# Patient Record
Sex: FEMALE | Race: BLACK OR AFRICAN AMERICAN | NOT HISPANIC OR LATINO | Employment: FULL TIME | URBAN - METROPOLITAN AREA
[De-identification: names, ages, dates, MRNs, and addresses within clinical notes are randomized per-mention and may not be internally consistent; named-entity substitution may affect disease eponyms.]

---

## 2019-04-02 DIAGNOSIS — Z12.39 BREAST SCREENING: Primary | ICD-10-CM

## 2019-04-26 ENCOUNTER — HOSPITAL ENCOUNTER (OUTPATIENT)
Dept: RADIOLOGY | Facility: OTHER | Age: 64
Discharge: HOME | End: 2019-04-26
Attending: NURSE PRACTITIONER
Payer: MEDICARE

## 2019-04-26 VITALS — BODY MASS INDEX: 23.92 KG/M2 | HEIGHT: 63 IN | WEIGHT: 135 LBS

## 2019-04-26 DIAGNOSIS — Z12.39 BREAST SCREENING: ICD-10-CM

## 2019-04-26 PROCEDURE — 77063 MAMMO DIGITAL SCREENING BILAT WITH TOMOSYNTHESIS_CAD: ICD-10-PCS | Mod: 26,,, | Performed by: INTERNAL MEDICINE

## 2019-04-26 PROCEDURE — 77067 SCR MAMMO BI INCL CAD: CPT | Mod: 26,,, | Performed by: INTERNAL MEDICINE

## 2019-04-26 PROCEDURE — 77067 SCR MAMMO BI INCL CAD: CPT | Mod: TC

## 2019-04-26 PROCEDURE — 77063 BREAST TOMOSYNTHESIS BI: CPT | Mod: 26,,, | Performed by: INTERNAL MEDICINE

## 2019-04-26 PROCEDURE — 77067 MAMMO DIGITAL SCREENING BILAT WITH TOMOSYNTHESIS_CAD: ICD-10-PCS | Mod: 26,,, | Performed by: INTERNAL MEDICINE

## 2020-05-11 ENCOUNTER — LAB VISIT (OUTPATIENT)
Dept: PRIMARY CARE CLINIC | Facility: CLINIC | Age: 65
End: 2020-05-11
Payer: MEDICARE

## 2020-05-11 DIAGNOSIS — Z00.6 RESEARCH STUDY PATIENT: Primary | ICD-10-CM

## 2020-05-11 LAB — SARS-COV-2 IGG SERPLBLD QL IA.RAPID: NEGATIVE

## 2020-05-11 PROCEDURE — 86769 SARS-COV-2 COVID-19 ANTIBODY: CPT

## 2020-05-11 PROCEDURE — U0002 COVID-19 LAB TEST NON-CDC: HCPCS

## 2020-05-11 NOTE — RESEARCH
Date of Consent: 05/11/2020     Sponsor: [facility]    Study Title/IRB Number: Observational study of Sars-CoV2 Immunoglobulin G (IgG) seroprevalence among the [facility] population over time 2020.163  Principle Investigator: Melissa Farr, PhD    Did the patient need translation services? No   name: N/A    Prior to the Informed Consent (IC) being signed, or any study protocol required data collection, testing, procedure, or intervention being performed, the following was done and/or discussed:   Patient was given a paper copy of the IC for review    Patient was given study FAQ   Purpose of the study and qualifications to participate    Study design and tests or procedures done at this visit   Confidentiality and HIPAA Authorization for Release of Medical Records for the research trial/ subject's rights/research related injury   Risk, Benefits, Alternative Treatments, Compensation and Costs   Participation in the research trial is voluntary and patient may withdraw at anytime   Contact information for study related questions    Patient verbalizes understanding of the above: Yes  Contact information for PI and IRB given to patient: Yes  Patient able to adequately summarize: the purpose of the study, the risks associated with the study, and all procedures, testing, and follow-ups associated with the study: Yes    The consent was discussed verbally with the patient and all questions were answered satisfactorily. Patient gave verbal consent for the Seroprevalence research study with an IRB approval date of 05/08/2020.      The Consent, Consent Witness and name of Clinical Research Coordinator consenting was captured and documented in REDCap.    All Inclusion and Exclusion Criteria reviewed, subject meets all Inclusion criteria and does not meet any Exclusion Criteria at this time.     Patient Eligibility was confirmed.    Patient responded to survey questions.    The following biospecimen  collection procedures were collected:    -Nasopharyngeal Swab Collection  -Blood collection  Date of Consent: 05/11/2020     Sponsor: [facility]    Study Title/IRB Number: Observational study of Sars-CoV2 Immunoglobulin G (IgG) seroprevalence among the [facility] population over time 2020.163  Principle Investigator: Melissa Farr, PhD    Did the patient need translation services? No   name: N/A    Prior to the Informed Consent (IC) being signed, or any study protocol required data collection, testing, procedure, or intervention being performed, the following was done and/or discussed:   Patient was given a paper copy of the IC for review    Patient was given study FAQ   Purpose of the study and qualifications to participate    Study design and tests or procedures done at this visit   Confidentiality and HIPAA Authorization for Release of Medical Records for the research trial/ subject's rights/research related injury   Risk, Benefits, Alternative Treatments, Compensation and Costs   Participation in the research trial is voluntary and patient may withdraw at anytime   Contact information for study related questions    Patient verbalizes understanding of the above: Yes  Contact information for PI and IRB given to patient: Yes  Patient able to adequately summarize: the purpose of the study, the risks associated with the study, and all procedures, testing, and follow-ups associated with the study: Yes    The consent was discussed verbally with the patient and all questions were answered satisfactorily. Patient gave verbal consent for the Seroprevalence research study with an IRB approval date of 05/08/2020.      The Consent, Consent Witness and name of Clinical Research Coordinator consenting was captured and documented in REDCap.    All Inclusion and Exclusion Criteria reviewed, subject meets all Inclusion criteria and does not meet any Exclusion Criteria at this time.     Patient  Eligibility was confirmed.    Patient responded to survey questions.    The following biospecimen collection procedures were collected:    -Nasopharyngeal Swab Collection  -Blood collection

## 2020-05-12 LAB — SARS-COV-2 RNA RESP QL NAA+PROBE: NOT DETECTED

## 2020-12-15 DIAGNOSIS — Z12.31 ENCOUNTER FOR SCREENING MAMMOGRAM FOR MALIGNANT NEOPLASM OF BREAST: Primary | ICD-10-CM

## 2020-12-17 ENCOUNTER — HOSPITAL ENCOUNTER (OUTPATIENT)
Dept: RADIOLOGY | Facility: OTHER | Age: 65
Discharge: HOME | End: 2020-12-17
Attending: NURSE PRACTITIONER
Payer: MEDICARE

## 2020-12-17 DIAGNOSIS — Z12.31 ENCOUNTER FOR SCREENING MAMMOGRAM FOR MALIGNANT NEOPLASM OF BREAST: ICD-10-CM

## 2020-12-17 PROCEDURE — 77067 MAMMO DIGITAL SCREENING BILAT WITH TOMO: ICD-10-PCS | Mod: 26,,, | Performed by: RADIOLOGY

## 2020-12-17 PROCEDURE — 77063 BREAST TOMOSYNTHESIS BI: CPT | Mod: 26,,, | Performed by: RADIOLOGY

## 2020-12-17 PROCEDURE — 77063 MAMMO DIGITAL SCREENING BILAT WITH TOMO: ICD-10-PCS | Mod: 26,,, | Performed by: RADIOLOGY

## 2020-12-17 PROCEDURE — 77067 SCR MAMMO BI INCL CAD: CPT | Mod: 26,,, | Performed by: RADIOLOGY

## 2020-12-17 PROCEDURE — 77067 SCR MAMMO BI INCL CAD: CPT | Mod: TC

## 2021-10-26 DIAGNOSIS — Z12.31 ENCOUNTER FOR SCREENING MAMMOGRAM FOR MALIGNANT NEOPLASM OF BREAST: Primary | ICD-10-CM

## 2022-01-11 ENCOUNTER — HOSPITAL ENCOUNTER (OUTPATIENT)
Dept: RADIOLOGY | Facility: OTHER | Age: 67
Discharge: HOME | End: 2022-01-11
Attending: NURSE PRACTITIONER
Payer: MEDICARE

## 2022-01-11 DIAGNOSIS — Z12.31 ENCOUNTER FOR SCREENING MAMMOGRAM FOR MALIGNANT NEOPLASM OF BREAST: ICD-10-CM

## 2022-01-11 PROCEDURE — 77063 MAMMO DIGITAL SCREENING BILAT WITH TOMO: ICD-10-PCS | Mod: 26,,, | Performed by: RADIOLOGY

## 2022-01-11 PROCEDURE — 77067 MAMMO DIGITAL SCREENING BILAT WITH TOMO: ICD-10-PCS | Mod: 26,,, | Performed by: RADIOLOGY

## 2022-01-11 PROCEDURE — 77067 SCR MAMMO BI INCL CAD: CPT | Mod: TC

## 2022-01-11 PROCEDURE — 77067 SCR MAMMO BI INCL CAD: CPT | Mod: 26,,, | Performed by: RADIOLOGY

## 2022-01-11 PROCEDURE — 77063 BREAST TOMOSYNTHESIS BI: CPT | Mod: 26,,, | Performed by: RADIOLOGY

## 2022-01-11 PROCEDURE — 77063 BREAST TOMOSYNTHESIS BI: CPT | Mod: TC

## 2022-02-08 ENCOUNTER — CLINICAL SUPPORT (OUTPATIENT)
Dept: SMOKING CESSATION | Facility: CLINIC | Age: 67
End: 2022-02-08
Payer: COMMERCIAL

## 2022-02-08 DIAGNOSIS — F17.200 NICOTINE DEPENDENCE: Primary | ICD-10-CM

## 2022-02-08 PROCEDURE — 99404 PR PREVENT COUNSEL,INDIV,60 MIN: ICD-10-PCS | Mod: S$GLB,,,

## 2022-02-08 PROCEDURE — 99404 PREV MED CNSL INDIV APPRX 60: CPT | Mod: S$GLB,,,

## 2022-02-08 RX ORDER — MICONAZOLE NITRATE 2 %
2 CREAM (GRAM) TOPICAL
Qty: 50 EACH | Refills: 0 | Status: SHIPPED | OUTPATIENT
Start: 2022-02-08

## 2022-02-08 RX ORDER — IBUPROFEN 200 MG
1 TABLET ORAL DAILY
Qty: 14 PATCH | Refills: 0 | Status: SHIPPED | OUTPATIENT
Start: 2022-02-08

## 2022-02-08 RX ORDER — FLUTICASONE PROPIONATE 50 MCG
1 SPRAY, SUSPENSION (ML) NASAL DAILY
COMMUNITY

## 2022-02-08 NOTE — Clinical Note
Patient will be participating in biweekly tobacco cessation sessions and will begin the prescribed tobacco cessation medication regimen of 21 mg nicotine patch daily and 2 mg nicotine gum PRN(in place of a cigarette).

## 2022-02-22 ENCOUNTER — CLINICAL SUPPORT (OUTPATIENT)
Dept: SMOKING CESSATION | Facility: CLINIC | Age: 67
End: 2022-02-22
Payer: COMMERCIAL

## 2022-02-22 DIAGNOSIS — F17.200 NICOTINE DEPENDENCE: Primary | ICD-10-CM

## 2022-02-22 PROCEDURE — 99403 PREV MED CNSL INDIV APPRX 45: CPT | Mod: S$GLB,,,

## 2022-02-22 PROCEDURE — 99403 PR PREVENT COUNSEL,INDIV,45 MIN: ICD-10-PCS | Mod: S$GLB,,,

## 2022-02-22 NOTE — Clinical Note
Patient seen in clinic today, she states tobacco free since 2/8/2022. Commended patient on the accomplishment thus far. Patient remains on prescribed tobacco cessation medication regimen of 21 mg patch without any negative side effects at this time. The patient denies any abnormal behavioral or mental changes at this time. She states no refill needed.  Discussed and reviewed strategies, controlling environment, cues, triggers, new goals set. Introduced high risk situations with preparation interventions, caffeine similarities with withdrawal issues of habit and nicotine, alcohol, understanding urges, cravings, stress and relaxation.

## 2022-02-22 NOTE — PROGRESS NOTES
Individual Follow-Up Form    2/22/2022    Quit Date: 2/8/2022    Clinical Status of Patient: Outpatient    Length of Service: 45 minutes    Continuing Medication: yes  Patches    Other Medications:      Target Symptoms: Withdrawal and medication side effects. The following were  rated moderate (3) to severe (4) on TCRS:  · Moderate (3): none  · Severe (4): none    Comments: Patient seen in clinic today, she states tobacco free since 2/8/2022. Commended patient on the accomplishment thus far. Patient remains on prescribed tobacco cessation medication regimen of 21 mg patch without any negative side effects at this time. The patient denies any abnormal behavioral or mental changes at this time. She states no refill needed.   Discussed and reviewed strategies, controlling environment, cues, triggers, new goals set. Introduced high risk situations with preparation interventions, caffeine similarities with withdrawal issues of habit and nicotine, alcohol, understanding urges, cravings, stress and relaxation.         Diagnosis: F17.200    Next Visit: 2 weeks

## 2022-03-14 ENCOUNTER — TELEPHONE (OUTPATIENT)
Dept: SMOKING CESSATION | Facility: CLINIC | Age: 67
End: 2022-03-14
Payer: MEDICARE

## 2022-03-14 NOTE — TELEPHONE ENCOUNTER
Called patient in regard to missed tobacco cessation follow up appointment, had to leave a detailed message with name and telephone number for a call back.

## 2022-05-29 ENCOUNTER — HOSPITAL ENCOUNTER (EMERGENCY)
Facility: OTHER | Age: 67
Discharge: HOME | End: 2022-05-29
Attending: EMERGENCY MEDICINE
Payer: MEDICARE

## 2022-05-29 VITALS
DIASTOLIC BLOOD PRESSURE: 54 MMHG | SYSTOLIC BLOOD PRESSURE: 104 MMHG | BODY MASS INDEX: 23.92 KG/M2 | RESPIRATION RATE: 18 BRPM | TEMPERATURE: 99 F | WEIGHT: 135 LBS | HEIGHT: 63 IN | HEART RATE: 101 BPM | OXYGEN SATURATION: 95 %

## 2022-05-29 DIAGNOSIS — J18.9 COMMUNITY ACQUIRED PNEUMONIA OF LEFT LOWER LOBE OF LUNG: Primary | ICD-10-CM

## 2022-05-29 DIAGNOSIS — R50.9 FEVER: ICD-10-CM

## 2022-05-29 LAB
CTP QC/QA: YES
CTP QC/QA: YES
POC MOLECULAR INFLUENZA A AGN: NEGATIVE
POC MOLECULAR INFLUENZA B AGN: NEGATIVE
SARS-COV-2 RDRP RESP QL NAA+PROBE: NEGATIVE

## 2022-05-29 PROCEDURE — 63700000 PHARM REV CODE 250 ALT 637 W/O HCPCS: Performed by: EMERGENCY MEDICINE

## 2022-05-29 PROCEDURE — 99284 EMERGENCY DEPT VISIT MOD MDM: CPT | Mod: 25

## 2022-05-29 PROCEDURE — U0002 COVID-19 LAB TEST NON-CDC: HCPCS | Performed by: EMERGENCY MEDICINE

## 2022-05-29 PROCEDURE — 25000003 PHARM REV CODE 250: Performed by: EMERGENCY MEDICINE

## 2022-05-29 RX ORDER — ACETAMINOPHEN 325 MG/1
650 TABLET ORAL
Status: COMPLETED | OUTPATIENT
Start: 2022-05-29 | End: 2022-05-29

## 2022-05-29 RX ORDER — PROMETHAZINE HYDROCHLORIDE AND CODEINE PHOSPHATE 6.25; 1 MG/5ML; MG/5ML
5 SOLUTION ORAL EVERY 4 HOURS PRN
Qty: 118 ML | Refills: 0 | Status: SHIPPED | OUTPATIENT
Start: 2022-05-29 | End: 2022-06-08

## 2022-05-29 RX ORDER — BENZONATATE 100 MG/1
100 CAPSULE ORAL 3 TIMES DAILY PRN
Qty: 20 CAPSULE | Refills: 0 | Status: SHIPPED | OUTPATIENT
Start: 2022-05-29 | End: 2022-06-08

## 2022-05-29 RX ORDER — AZITHROMYCIN 250 MG/1
250 TABLET, FILM COATED ORAL DAILY
Qty: 6 TABLET | Refills: 0 | Status: SHIPPED | OUTPATIENT
Start: 2022-05-29 | End: 2023-09-19

## 2022-05-29 RX ORDER — AZITHROMYCIN 250 MG/1
500 TABLET, FILM COATED ORAL
Status: COMPLETED | OUTPATIENT
Start: 2022-05-29 | End: 2022-05-29

## 2022-05-29 RX ADMIN — Medication 650 MG: at 08:05

## 2022-05-29 RX ADMIN — AZITHROMYCIN MONOHYDRATE 500 MG: 250 TABLET ORAL at 09:05

## 2022-05-30 NOTE — ED TRIAGE NOTES
Pt arrived with c/o malaise x 3 days.  Pt had a negative home COVID test today.  Pt reports productive cough, endorses HA and dizziness.  Ambulatory with steady gait, no assistance needed.  Denies any n/v/d.  Febrile in triage, has not taken any anti-pyretics today.  aao x 4.  Answering questions appropriately.

## 2022-05-30 NOTE — ED PROVIDER NOTES
"     Source of History:  Patient    Chief complaint:  COVID-19 Concerns (Body aches with a headache for the past three days.) and Fever (Pt is also running at fever at this time 102.3)      HPI:  Catalina Mcgarry is a 67 y.o. female presenting with gradual onset of body aches, fevers, chills and headache this been going on for 3 days.  Also had temperature up to 102.3.  Intermittent shortness of breath.  Also has associated nausea vomiting.  No abdominal pain or diarrhea.  She has been vaccinated for COVID.  No known sick contacts.    This is the extent to the patients complaints today here in the emergency department.    ROS: As per HPI and below:  Constitutional:  Fevers    Eyes: No visual changes.  ENT:  Congestion   Cardiovascular: No chest pain.  Respiratory: No shortness of breath cough  GI: No abdominal pain.  No nausea or vomiting.  Genito-Urinary: No abnormal urination.  Neurologic: No focal weakness.  No numbness.   MSK: no back pain.  Integument: No rashes or lesions.  Hematologic: No easy bruising.  Endocrine: No excessive thirst or urination.    Review of patient's allergies indicates:  No Known Allergies    PMH:  As per HPI and below:  Past Medical History:   Diagnosis Date    Diabetes mellitus     Diabetes mellitus, type 2     Headache(784.0)      Past Surgical History:   Procedure Laterality Date    BREAST BIOPSY      GUM SURGERY         Social History     Tobacco Use    Smoking status: Former Smoker     Packs/day: 0.50     Types: Cigarettes     Quit date: 2014     Years since quittin.8   Substance Use Topics    Alcohol use: No    Drug use: No       Physical Exam:    /65   Pulse 110   Temp (!) 102.8 °F (39.3 °C) (Oral)   Resp 16   Ht 5' 3" (1.6 m)   Wt 61.2 kg (135 lb)   SpO2 95%   BMI 23.91 kg/m²   Nursing note and vital signs reviewed.  Constitutional: No acute distress.  Nontoxic  Eyes: No conjunctival injection.  Extraocular muscles are intact.  ENT: Oropharynx clear. " No tonisillar exudate or swelling. Uvula midline and normal. No elevation of posterior oropharynx.  Nasal congestion with mucosal edema  Cardiovascular:  Tachycardia regular No murmurs. No gallops. No rubs.  Respiratory: Clear to auscultation bilaterally.  Good air movement.  No wheezes.  No rhonchi. No rales. No accessory muscle use.  Abdomen: Soft.  Not distended.  Nontender.  No guarding.  No rebound. Non-peritoneal.  Musculoskeletal: Good range of motion all joints.  No deformities.  Neck supple.  No meningismus.  Skin: No rashes seen.  Good turgor.  No abrasions.  No ecchymoses.  Neuro: alert and oriented x3,  no focal neurological deficits.  Psych: Appropriate, conversant    Labs that have been ordered have been independently reviewed and interpreted by myself.    I decided to obtain the patient's medical records.  Summary of Medical Records:      MDM/ Differential Dx:  67 y.o. female with symptoms suggestive of viral etiology.  Possible influenza or COVID which will test for.  Vital signs otherwise showed no significant abnormality and the patient appears well.  I do not suspect sepsis do not feel any blood work is indicated.  Due to the patient the fact that she is diabetic will get an x-ray to rule out pneumonia and plan for discharge.    ED Course as of 05/29/22 2040   Sun May 29, 2022   2012 POC Molecular Influenza A Ag: Negative [SM]   2012 POC Molecular Influenza B Ag: Negative [SM]   2012 SARS-CoV-2 RNA, Amplification, Qual: Negative [SM]   2031 X-Ray Chest AP Portable  Chest x-ray independently interpreted by myself shows normal cardiac silhouette, no large focal consolidation, some increased interstitial markings in the left base.  No bony abnormalities or pneumothorax.  Overall impression atelectasis verses early pneumonia left lower base. [SM]   2039 No further workup indicated here in the emergency department.    I updated pt regarding results and I counseled pt regarding supportive care measures.   Diagnosis and treatment plan explained to patient. I have answered all questions and the patient is satisfied with the plan of care. Patient discharged home in stable condition.  [SM]      ED Course User Index  [SM] Robert Devlin DO                 Diagnostic Impression:    1. Community acquired pneumonia of left lower lobe of lung    2. Fever         ED Disposition Condition    Discharge Stable          ED Prescriptions     Medication Sig Dispense Start Date End Date Auth. Provider    azithromycin (Z-RAFAEL) 250 MG tablet Take 1 tablet (250 mg total) by mouth once daily. Take first 2 tablets together, then 1 every day until finished. 6 tablet 5/29/2022  Robert Devlin DO    benzonatate (TESSALON) 100 MG capsule Take 1 capsule (100 mg total) by mouth 3 (three) times daily as needed for Cough. 20 capsule 5/29/2022 6/8/2022 Robert Devlin DO        Follow-up Information     Follow up With Specialties Details Why Contact Info    Pamela Turner NP Urgent Care Schedule an appointment as soon as possible for a visit in 1 week  [address] [address]  [phone]      [facility] Emergency Dept Emergency Medicine  If symptoms worsen [address] [id number] [phone]           Robert Devlin DO  05/29/22 2040

## 2022-09-07 ENCOUNTER — CLINICAL SUPPORT (OUTPATIENT)
Dept: SMOKING CESSATION | Facility: CLINIC | Age: 67
End: 2022-09-07
Payer: COMMERCIAL

## 2022-09-07 DIAGNOSIS — F17.200 NICOTINE DEPENDENCE: Primary | ICD-10-CM

## 2022-09-07 PROCEDURE — 99407 BEHAV CHNG SMOKING > 10 MIN: CPT | Mod: S$GLB,,,

## 2022-09-07 PROCEDURE — 99407 PR TOBACCO USE CESSATION INTENSIVE >10 MINUTES: ICD-10-PCS | Mod: S$GLB,,,

## 2022-09-07 NOTE — PROGRESS NOTES
Called pt to f/u on her 3/6 month smoking cessation quit status. Pt stated she remains tobacco free since 2/8/22 after smoking 2 ppd. Congratulated her on her hard work and success. Informed her of benefit period, phone follow ups, and contact information. Will complete smart form for 3/6 months and will continue to follow up on quit #1 episode.

## 2023-02-02 DIAGNOSIS — Z12.31 SCREENING MAMMOGRAM, ENCOUNTER FOR: Primary | ICD-10-CM

## 2023-02-08 ENCOUNTER — CLINICAL SUPPORT (OUTPATIENT)
Dept: SMOKING CESSATION | Facility: CLINIC | Age: 68
End: 2023-02-08
Payer: COMMERCIAL

## 2023-02-08 DIAGNOSIS — F17.200 NICOTINE DEPENDENCE: Primary | ICD-10-CM

## 2023-02-08 PROCEDURE — 99407 PR TOBACCO USE CESSATION INTENSIVE >10 MINUTES: ICD-10-PCS | Mod: S$GLB,,, | Performed by: FAMILY MEDICINE

## 2023-02-08 PROCEDURE — 99407 BEHAV CHNG SMOKING > 10 MIN: CPT | Mod: S$GLB,,, | Performed by: FAMILY MEDICINE

## 2023-02-08 NOTE — PROGRESS NOTES
Spoke with patient today in regard to smoking cessation progress, he states tobacco free. Commended patient on the accomplishment. Informed patient of benefit period and contact information if any further help or support is needed. Will resolve episode and complete smart form for Quit attempt #1.

## 2023-08-29 ENCOUNTER — TELEPHONE (OUTPATIENT)
Dept: INTERNAL MEDICINE | Facility: CLINIC | Age: 68
End: 2023-08-29
Payer: MEDICARE

## 2023-08-29 NOTE — TELEPHONE ENCOUNTER
Patient has diabetes and just started on insulin pump, requesting to switch diabetes providers...   Can you call her and see if she can schedule in next 3 - 4 weeks with me?   This is a new patient, on pump     ThanksAbby

## 2023-09-19 ENCOUNTER — OFFICE VISIT (OUTPATIENT)
Dept: INTERNAL MEDICINE | Facility: CLINIC | Age: 68
End: 2023-09-19
Payer: MEDICARE

## 2023-09-19 DIAGNOSIS — E10.3593 PROLIFERATIVE DIABETIC RETINOPATHY OF BOTH EYES ASSOCIATED WITH TYPE 1 DIABETES MELLITUS, UNSPECIFIED PROLIFERATIVE RETINOPATHY TYPE: ICD-10-CM

## 2023-09-19 DIAGNOSIS — E10.65 TYPE 1 DIABETES MELLITUS WITH HYPERGLYCEMIA: Primary | ICD-10-CM

## 2023-09-19 DIAGNOSIS — E10.8 DIABETES MELLITUS TYPE 1, WITH COMPLICATION, ON LONG TERM INSULIN PUMP: ICD-10-CM

## 2023-09-19 DIAGNOSIS — Z96.41 DIABETES MELLITUS TYPE 1, WITH COMPLICATION, ON LONG TERM INSULIN PUMP: ICD-10-CM

## 2023-09-19 PROCEDURE — 95251 CONT GLUC MNTR ANALYSIS I&R: CPT | Mod: S$GLB,,, | Performed by: NURSE PRACTITIONER

## 2023-09-19 PROCEDURE — 99215 PR OFFICE/OUTPT VISIT, EST, LEVL V, 40-54 MIN: ICD-10-PCS | Mod: S$GLB,,, | Performed by: NURSE PRACTITIONER

## 2023-09-19 PROCEDURE — 99215 OFFICE O/P EST HI 40 MIN: CPT | Mod: S$GLB,,, | Performed by: NURSE PRACTITIONER

## 2023-09-19 PROCEDURE — 95251 PR GLUCOSE MONITOR, 72 HOUR, PHYS INTERP: ICD-10-PCS | Mod: S$GLB,,, | Performed by: NURSE PRACTITIONER

## 2023-09-19 PROCEDURE — 99999 PR PBB SHADOW E&M-EST. PATIENT-LVL I: CPT | Mod: PBBFAC,,, | Performed by: NURSE PRACTITIONER

## 2023-09-19 PROCEDURE — 95249 PR GLUCOSE MONITORING, 72 HRS, SUB-Q SENSOR, PATIENT PROVIDED: ICD-10-PCS | Mod: S$GLB,,, | Performed by: NURSE PRACTITIONER

## 2023-09-19 PROCEDURE — 95249 CONT GLUC MNTR PT PROV EQP: CPT | Mod: S$GLB,,, | Performed by: NURSE PRACTITIONER

## 2023-09-19 PROCEDURE — 99999 PR PBB SHADOW E&M-EST. PATIENT-LVL I: ICD-10-PCS | Mod: PBBFAC,,, | Performed by: NURSE PRACTITIONER

## 2023-09-19 RX ORDER — BLOOD-GLUCOSE SENSOR
1 EACH MISCELLANEOUS
Qty: 10 EACH | Refills: 3 | Status: SHIPPED | OUTPATIENT
Start: 2023-09-19 | End: 2024-09-18

## 2023-09-19 RX ORDER — INSULIN ASPART 100 [IU]/ML
100 INJECTION, SOLUTION INTRAVENOUS; SUBCUTANEOUS CONTINUOUS
Qty: 40 ML | Refills: 11 | Status: SHIPPED | OUTPATIENT
Start: 2023-09-19 | End: 2023-09-21 | Stop reason: SDUPTHER

## 2023-09-19 RX ORDER — INSULIN PMP CART,AUT,G6/7,CNTR
1 EACH SUBCUTANEOUS
Qty: 30 EACH | Refills: 3 | Status: SHIPPED | OUTPATIENT
Start: 2023-09-19 | End: 2023-11-28 | Stop reason: SDUPTHER

## 2023-09-19 RX ORDER — BLOOD-GLUCOSE TRANSMITTER
1 EACH MISCELLANEOUS DAILY
Qty: 1 EACH | Refills: 3 | Status: SHIPPED | OUTPATIENT
Start: 2023-09-19

## 2023-09-19 RX ORDER — GLUCAGON 3 MG/1
1 POWDER NASAL DAILY PRN
Qty: 2 EACH | Refills: 1 | Status: SHIPPED | OUTPATIENT
Start: 2023-09-19 | End: 2024-02-07 | Stop reason: SDUPTHER

## 2023-09-19 NOTE — PROGRESS NOTES
HPI: Catalina Mcgarry is a 68 y.o.  female c/I for visit to address Diabetes Type 1  This is the first time I am seeing her for care, follows with Pamela King, NP for primary care needs.   Has seen endocrinologist in the past - Dr. Echeverria in the past but had trouble with follow up. Only seen 1 time.   Was trained with diabetes educator on new insulin pump.      was diagnosed with T1DM during pregnancy in 1978 -   Began on insulin injections at that time, her mother had T2dm -   Her son has type 1 diabetes and is on injections- now pump therapy.   Has never been hospitalized r/t DM. Has never had DKA in the past.   Denies missing doses of DM medication.      On omnipod 5 - began/trained with Omnipod educator - about 1 month ago now.   Omnipod 5 - pods come from [email or web address].   Using novolog U100   Is in automated mode -   Bassal rate is at 0. 65 units/hour -   Target glucose is 120 -   Correct above 120 -   Insulin to carb ratio 1: 15 -   ICF - 1: 55   Duration of action - 3 hours.      Glooko report - we could not program her pdm    Has to re-register it - so was unable to download a Glooko report.      Dexcom G6 - gets supplies from [email or web address].   Shows gmi is at 7.1% -   Average bg of 157   73% time in range.   1% lows.   0% very low  25% highs.   1% very highs.        Labs reviewed in past - cpeptide >0.01 in 2011 -   Haven't repeated since 2011.      Complications from diabetes and pertinent co-morbidities include:   Negative for DKA.   Has BEEN ON INSULIN FOR 45 + YEARS SINCE DIAGNOSIS.   -negative for diabetic neuropathy.   -negative for nephropathy.   No microalbuminuria.   Eyes:  + Diabetic retinopathy   CV: Denies history of MI nor stroke.   CAD: Denies.  Takes aspirin 81mg tablet daily  BP: has history of HTN  Statin: Not taking  ACE/ARB: Not taking     Social History           Tobacco Use   Smoking Status Former    Current packs/day: 0.00    Types: Cigarettes    Quit date: 7/14/2014     Years since quittin.1   Smokeless Tobacco Not on file      Past medical History:        Past Medical History:   Diagnosis Date    Diabetes mellitus      Diabetes mellitus, type 2      Headache(784.0)        Family hx:         Family History   Problem Relation Age of Onset    Diabetes Maternal Aunt      Diabetes Son      Breast cancer Maternal Grandmother      Migraines Neg Hx        Current meds:   Current Outpatient Medications:     acetaminophen (TYLENOL) 500 MG tablet, Take 2 tablets (1,000 mg total) by mouth every 8 (eight) hours as needed for Pain., Disp: 30 tablet, Rfl: 0    ascorbic acid (VITAMIN C) 500 MG tablet, Take 500 mg by mouth once daily., Disp: , Rfl:     blood-glucose sensor (DEXCOM G6 SENSOR) Eileen, 1 each by Misc.(Non-Drug; Combo Route) route every 10 days. Apply/change sensor to skin every 10 days., Disp: 10 each, Rfl: 3    blood-glucose transmitter (DEXCOM G6 TRANSMITTER) Eileen, 1 each by Misc.(Non-Drug; Combo Route) route Daily. Use transmitter in sensor with G6 system. Change new transmitter every 3 months., Disp: 1 each, Rfl: 3    fluticasone propionate (FLONASE) 50 mcg/actuation nasal spray, 1 spray by Each Nostril route once daily., Disp: , Rfl:     glucagon (BAQSIMI) 3 mg/actuation Spry, 1 each by Nasal route daily as needed (IF GLUCOSE IS LESS THAN 70)., Disp: 2 each, Rfl: 1    ibuprofen (ADVIL,MOTRIN) 600 MG tablet, Take 1 tablet (600 mg total) by mouth every 8 (eight) hours as needed for Pain., Disp: 20 tablet, Rfl: 0    insulin aspart U-100 (NOVOLOG) 100 unit/mL injection, Inject 100 Units into the skin continuous. Gives up to 100 units daily via Omnipod insulin pump - do not directly inject. Only use with insulin pump, Disp: 40 mL, Rfl: 11    insulin pump cart,automated,BT (OMNIPOD 5 G6 PODS, GEN 5,) Crtg, Inject 1 each into the skin every 72 hours. Use with omnipod 5 pdm as directed., Disp: 30 each, Rfl: 3    nicotine (NICODERM CQ) 21 mg/24 hr, Place 1 patch onto the skin once  "daily., Disp: 14 patch, Rfl: 0    nicotine, polacrilex, (NICORETTE) 2 mg Gum, Take 1 each (2 mg total) by mouth as needed (in place of a cigarette). Max 10-20 pieces per day, Disp: 50 each, Rfl: 0      Current Diabetes medications:   Novolog U100 via omnipod 5 -      Medications Tried and Failed:   Lantus 20 units daily.   Novolog 3 units tid ac meals.   Metformin - Gi issues.      Social:   No Life changes/stressors currently:   Diet: following ADA diet for most part  Meals: 3 per day and snacks.     Exercise: yes - regularly 3 times per week.   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       Standards of care:   Eyes: .Most Recent Eye Exam Date: Not Found  Foot exam: Most Recent Foot Exam Date: Not Found   Diabetes education: None.     Vital Signs  There were no vitals taken for this visit.     Pertinent Labs:   Hgba1c         Lab Results   Component Value Date     HGBA1C 10.7 (H) 03/17/2011      Lipid panel No results found for: "CHOL"  No results found for: "HDL"  No results found for: "LDLCALC"  No results found for: "TRIG"  No results found for: "CHOLHDL"   CMP        Glucose   Date Value Ref Range Status   03/31/2013 117 (H) 70 - 110 mg/dl Final            BUN   Date Value Ref Range Status   03/31/2013 11 6 - 20 mg/dl Final            Creatinine   Date Value Ref Range Status   03/31/2013 0.7 0.5 - 1.4 mg/dl Final              eGFR if    Date Value Ref Range Status   03/31/2013 >60 >60 mL/min Final       Comment:       Estimated glomerular filtration rate (eGFR) is normalized to an  average body surface area of 1.73 square meters.  The calculation  used to obtain the eGFR is the adjusted MDRD equation, which factors  patient sex, age, race, and creatinine result.  Since race is unknown  in our information system, the eGFR values for -American  and Non--American patients are given for each " creatinine  result.            eGFR if non    Date Value Ref Range Status   03/31/2013 >60 >60 mL/min Final            AST   Date Value Ref Range Status   03/31/2013 11 10 - 40 U/L Final            ALT   Date Value Ref Range Status   03/31/2013 9 (L) 10 - 44 U/L Final      Microalbumin creatinine ratio:         Lab Results   Component Value Date     KATELYN 6.4 02/24/2011         Review Of Systems:   Gen: Appetite good, no weight gain or loss, denies fatigue and weakness. Denies polydipsia.  Skin: Skin is intact and heals well, denies any rashes or hair changes.   EENT: Denies any acute visual disturbances, nor blurred vision.    Resp: Denies SOB or Dyspnea on exertion, denies cough.   Cardiac: Denies chest pain, palpitations, or swelling.   GI: Denies abdominal pain, nausea or vomiting, diarrhea, or constipation.   /GYN: Denies nocturia, nor burning, frequency or pain on urination.  MS/Neuro: Denies numbness/ tingling in BLE; Gait steady, speech clear  Psych: Denies drug/ETOH abuse, no hx of depression.  Other systems: negative.     Physical Exam:   GENERAL: Well developed, well nourished in appearance.   PSYCH: AAOx3, appropriate mood and affect, pleasant expression, conversant, appears relaxed, well groomed.   EYES: PERRL, Conjunctiva and corneas clear  NECK: Soft and Supple, trachea midline,   CHEST: Even, regular, and unlabored respirations  ABDOMEN: Soft, non-tender, non-distended.   VASCULAR: pedal pulses palpable bilaterally, no edema.  NEURO:  cranial nerves II - XII intact   MUSCULOSKELETAL: Good ROM, equal strength, equal hand grasp, steady gait.   SKIN: Skin warm, dry, and intact     Assessment and Plan of Care:      Diagnoses and all orders for this visit:     Type 1 diabetes mellitus with hyperglycemia  -     Anti-islet cell antibody; Future  -     Comprehensive Metabolic Panel; Future  -     C-Peptide; Future  -     Glutamic Acid Decarboxylase; Future  -     Hemoglobin A1C;  Future  -     Lipid Panel; Future  -     Microalbumin/Creatinine Ratio, Urine; Future  -     insulin aspart U-100 (NOVOLOG) 100 unit/mL injection; Inject 100 Units into the skin continuous. Gives up to 100 units daily via Omnipod insulin pump - do not directly inject. Only use with insulin pump  -     glucagon (BAQSIMI) 3 mg/actuation Spry; 1 each by Nasal route daily as needed (IF GLUCOSE IS LESS THAN 70).  -     insulin pump cart,automated,BT (OMNIPOD 5 G6 PODS, GEN 5,) Crtg; Inject 1 each into the skin every 72 hours. Use with omnipod 5 pdm as directed.  -     blood-glucose sensor (DEXCOM G6 SENSOR) Eileen; 1 each by Misc.(Non-Drug; Combo Route) route every 10 days. Apply/change sensor to skin every 10 days.  -     blood-glucose transmitter (DEXCOM G6 TRANSMITTER) Eileen; 1 each by Misc.(Non-Drug; Combo Route) route Daily. Use transmitter in sensor with G6 system. Change new transmitter every 3 months.     Proliferative diabetic retinopathy of both eyes associated with type 1 diabetes mellitus, unspecified proliferative retinopathy type     Diabetes mellitus type 1, with complication, on long term insulin pump           1. T1DM with hyperglycemia- Hgba1c goal is 7.5% or less without hypoglycemia - 10.7% - no current a1c - dexcom estimates her at 7.1% -   Repeat labs in am - type 1 labs, cmp, new a1c, and lipid panel.   No changes on pdm currently - need to log in and print out glooko report.   Keep target bg at 120 -   Basal rate at 0.65 units/hour.   Carb ratio 1: 15   ISF is at 1: 55   Basal rate no changes.   Continue to bolus before meals.   Sent vials and pods for refill to local [facility] pharmacy.   Dexcom g6 - supplies to [facility].   discussed DM, progression of disease, long term complications, CV risk factors and tx options.   Advise compliance with ADA diet and encourage exercise  Reviewed  hypoglycemia, s/s and appropriate tx. Have/get quick acting glucose tablets at hand.   Instructed to monitor Blood  glucose 2 - 4x/day and bring meter/ log to every clinic visit.   Gets injections in her eyes - Dr. Herrera.      2. HTN- controlled, continue meds as previously prescribed and monitor.   Needs urine mac lab done.      3. Lipids- LDL goal < 100. Need lipid panel - obtain.      4. Weight - BMI There is no height or weight on file to calculate BMI.   Encourage Ada diet and exercise.      5. Renal Function - get current cmp.            Follow up in 6 weeks time.                Deleted by: Abby Conklin NP at 9/19/2023  4:15 PM

## 2023-09-19 NOTE — PATIENT INSTRUCTIONS
For low blood sugar - remember to keep glucose tablets on hand. You can purchase these at the pharmacy check out desk/over the counter.   If blood sugar is less than 70, take/eat 2 - 3 tablets quickly to bring your sugar up.   Always keep 15 grams of Quick acting carbohydrates on hand to eat/drink if your sugar is low - examples are 1/2 cup of juice, coke, or crackers, granola bars.   Remember to eat meals frequently to prevent low blood blood sugars.

## 2023-09-19 NOTE — PROGRESS NOTES
HPI: Catalina Mcgarry is a 68 y.o.  female c/I for visit to address Diabetes Type 1  This is the first time I am seeing her for care, follows with Pamela King, NP for primary care needs.   Has seen endocrinologist in the past - Dr. Echeverria in the past but had trouble with follow up. Only seen 1 time.   Was trained with diabetes educator on new insulin pump.     was diagnosed with T1DM during pregnancy in  -   Began on insulin injections at that time, her mother had T2dm -   Her son has type 1 diabetes and is on injections- now pump therapy.   Has never been hospitalized r/t DM. Has never had DKA in the past.   Denies missing doses of DM medication.     On omnipod 5 - began/trained with Omnipod educator - about 1 month ago now.   Omnipod 5 - pods come from [employer].   Using novolog U100   Is in automated mode -   Bassal rate is at 0. 65 units/hour -   Target glucose is 120 -   Correct above 120 -   Insulin to carb ratio 1: 15 -   ICF - 1: 55   Duration of action - 3 hours.     Glooko report - we could not program her pdm    Has to re-register it - so was unable to download a Glooko report.     Dexcom G6 - gets supplies from [facility].   Shows gmi is at 7.1% -   Average bg of 157   73% time in range.   1% lows.   0% very low  25% highs.   1% very highs.       Labs reviewed in past - cpeptide >0.01 in 2011 -     Complications from diabetes and pertinent co-morbidities include:   Negative for DKA.   Has BEEN ON INSULIN FOR 45 + YEARS SINCE DIAGNOSIS.   -negative for diabetic neuropathy.   -negative for nephropathy.   No microalbuminuria.   Eyes:  + Diabetic retinopathy   CV: Denies history of MI nor stroke.   CAD: Denies.  Takes aspirin 81mg tablet daily  BP: has history of HTN  Statin: Not taking  ACE/ARB: Not taking    Social History     Tobacco Use   Smoking Status Former    Current packs/day: 0.00    Types: Cigarettes    Quit date: 2014    Years since quittin.1   Smokeless  Tobacco Not on file      Past medical History:   Past Medical History:   Diagnosis Date    Diabetes mellitus     Diabetes mellitus, type 2     Headache(784.0)       Family hx:   Family History   Problem Relation Age of Onset    Diabetes Maternal Aunt     Diabetes Son     Breast cancer Maternal Grandmother     Migraines Neg Hx       Current meds:   Current Outpatient Medications:     acetaminophen (TYLENOL) 500 MG tablet, Take 2 tablets (1,000 mg total) by mouth every 8 (eight) hours as needed for Pain., Disp: 30 tablet, Rfl: 0    ascorbic acid (VITAMIN C) 500 MG tablet, Take 500 mg by mouth once daily., Disp: , Rfl:     blood-glucose sensor (DEXCOM G6 SENSOR) Eileen, 1 each by Misc.(Non-Drug; Combo Route) route every 10 days. Apply/change sensor to skin every 10 days., Disp: 10 each, Rfl: 3    blood-glucose transmitter (DEXCOM G6 TRANSMITTER) Eileen, 1 each by Misc.(Non-Drug; Combo Route) route Daily. Use transmitter in sensor with G6 system. Change new transmitter every 3 months., Disp: 1 each, Rfl: 3    fluticasone propionate (FLONASE) 50 mcg/actuation nasal spray, 1 spray by Each Nostril route once daily., Disp: , Rfl:     glucagon (BAQSIMI) 3 mg/actuation Spry, 1 each by Nasal route daily as needed (IF GLUCOSE IS LESS THAN 70)., Disp: 2 each, Rfl: 1    ibuprofen (ADVIL,MOTRIN) 600 MG tablet, Take 1 tablet (600 mg total) by mouth every 8 (eight) hours as needed for Pain., Disp: 20 tablet, Rfl: 0    insulin aspart U-100 (NOVOLOG) 100 unit/mL injection, Inject 100 Units into the skin continuous. Gives up to 100 units daily via Omnipod insulin pump - do not directly inject. Only use with insulin pump, Disp: 40 mL, Rfl: 11    insulin pump cart,automated,BT (OMNIPOD 5 G6 PODS, GEN 5,) Crtg, Inject 1 each into the skin every 72 hours. Use with omnipod 5 pdm as directed., Disp: 30 each, Rfl: 3    nicotine (NICODERM CQ) 21 mg/24 hr, Place 1 patch onto the skin once daily., Disp: 14 patch, Rfl: 0    nicotine, polacrilex,  "(NICORETTE) 2 mg Gum, Take 1 each (2 mg total) by mouth as needed (in place of a cigarette). Max 10-20 pieces per day, Disp: 50 each, Rfl: 0     Current Diabetes medications:   Novolog U100 via omnipod 5 -     Medications Tried and Failed:   Lantus 20 units daily.   Novolog 3 units tid ac meals.     Social:   No Life changes/stressors currently:   Diet: following ADA diet for most part  Meals: 3 per day and snacks.     Exercise: yes - regularly 3 times per week.   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      Standards of care:   Eyes: .Most Recent Eye Exam Date: Not Found  Foot exam: Most Recent Foot Exam Date: Not Found   Diabetes education: None.    Vital Signs  There were no vitals taken for this visit.    Pertinent Labs:   Hgba1c   Lab Results   Component Value Date    HGBA1C 10.7 (H) 03/17/2011     Lipid panel No results found for: "CHOL"  No results found for: "HDL"  No results found for: "LDLCALC"  No results found for: "TRIG"  No results found for: "CHOLHDL"   CMP  Glucose   Date Value Ref Range Status   03/31/2013 117 (H) 70 - 110 mg/dl Final     BUN   Date Value Ref Range Status   03/31/2013 11 6 - 20 mg/dl Final     Creatinine   Date Value Ref Range Status   03/31/2013 0.7 0.5 - 1.4 mg/dl Final     eGFR if    Date Value Ref Range Status   03/31/2013 >60 >60 mL/min Final     Comment:     Estimated glomerular filtration rate (eGFR) is normalized to an  average body surface area of 1.73 square meters.  The calculation  used to obtain the eGFR is the adjusted MDRD equation, which factors  patient sex, age, race, and creatinine result.  Since race is unknown  in our information system, the eGFR values for -American  and Non--American patients are given for each creatinine  result.     eGFR if non    Date Value Ref Range Status   03/31/2013 >60 >60 mL/min Final     AST "   Date Value Ref Range Status   03/31/2013 11 10 - 40 U/L Final     ALT   Date Value Ref Range Status   03/31/2013 9 (L) 10 - 44 U/L Final     Microalbumin creatinine ratio:   Lab Results   Component Value Date    ALTAAT 6.4 02/24/2011       Review Of Systems:   Gen: Appetite good, no weight gain or loss, denies fatigue and weakness. Denies polydipsia.  Skin: Skin is intact and heals well, denies any rashes or hair changes.   EENT: Denies any acute visual disturbances, nor blurred vision.    Resp: Denies SOB or Dyspnea on exertion, denies cough.   Cardiac: Denies chest pain, palpitations, or swelling.   GI: Denies abdominal pain, nausea or vomiting, diarrhea, or constipation.   /GYN: Denies nocturia, nor burning, frequency or pain on urination.  MS/Neuro: Denies numbness/ tingling in BLE; Gait steady, speech clear  Psych: Denies drug/ETOH abuse, no hx of depression.  Other systems: negative.    Physical Exam:   GENERAL: Well developed, well nourished in appearance.   PSYCH: AAOx3, appropriate mood and affect, pleasant expression, conversant, appears relaxed, well groomed.   EYES: PERRL, Conjunctiva and corneas clear  NECK: Soft and Supple, trachea midline,   CHEST: Even, regular, and unlabored respirations  ABDOMEN: Soft, non-tender, non-distended.   VASCULAR: pedal pulses palpable bilaterally, no edema.  NEURO:  cranial nerves II - XII intact   MUSCULOSKELETAL: Good ROM, equal strength, equal hand grasp, steady gait.   SKIN: Skin warm, dry, and intact    Assessment and Plan of Care:     Diagnoses and all orders for this visit:    Type 1 diabetes mellitus with hyperglycemia  -     Anti-islet cell antibody; Future  -     Comprehensive Metabolic Panel; Future  -     C-Peptide; Future  -     Glutamic Acid Decarboxylase; Future  -     Hemoglobin A1C; Future  -     Lipid Panel; Future  -     Microalbumin/Creatinine Ratio, Urine; Future  -     insulin aspart U-100 (NOVOLOG) 100 unit/mL injection; Inject 100 Units  into the skin continuous. Gives up to 100 units daily via Omnipod insulin pump - do not directly inject. Only use with insulin pump  -     glucagon (BAQSIMI) 3 mg/actuation Spry; 1 each by Nasal route daily as needed (IF GLUCOSE IS LESS THAN 70).  -     insulin pump cart,automated,BT (OMNIPOD 5 G6 PODS, GEN 5,) Crtg; Inject 1 each into the skin every 72 hours. Use with omnipod 5 pdm as directed.  -     blood-glucose sensor (DEXCOM G6 SENSOR) Eileen; 1 each by Misc.(Non-Drug; Combo Route) route every 10 days. Apply/change sensor to skin every 10 days.  -     blood-glucose transmitter (DEXCOM G6 TRANSMITTER) Eileen; 1 each by Misc.(Non-Drug; Combo Route) route Daily. Use transmitter in sensor with G6 system. Change new transmitter every 3 months.    Proliferative diabetic retinopathy of both eyes associated with type 1 diabetes mellitus, unspecified proliferative retinopathy type    Diabetes mellitus type 1, with complication, on long term insulin pump         1. T1DM with hyperglycemia- Hgba1c goal is 7.5% or less without hypoglycemia - 10.7% - no current a1c - dexcom estimates her at 7.1% -   Repeat labs in am - type 1 labs, cmp, new a1c, and lipid panel.   No changes on pdm currently - need to log in and print out glooko report.   Keep target bg at 120 -   Basal rate no changes.   Continue to bolus before meals.   Sent vials and pods for refill to local [facility] pharmacy.   Dexcom g6 - supplies to [facility].   discussed DM, progression of disease, long term complications, CV risk factors and tx options.   Advise compliance with ADA diet and encourage exercise  Reviewed  hypoglycemia, s/s and appropriate tx. Have/get quick acting glucose tablets at hand.   Instructed to monitor Blood glucose 2 - 4x/day and bring meter/ log to every clinic visit.   Gets injections in her eyes - Dr. Herrera.     2. HTN- controlled, continue meds as previously prescribed and monitor.   Needs urine mac lab done.     3. Lipids- LDL goal <  100. Need lipid panel - obtain.     4. Weight - BMI There is no height or weight on file to calculate BMI.   Encourage Ada diet and exercise.     5. Renal Function - get current cmp.          Follow up in 6 weeks time.

## 2023-09-20 ENCOUNTER — TELEPHONE (OUTPATIENT)
Dept: INTERNAL MEDICINE | Facility: CLINIC | Age: 68
End: 2023-09-20
Payer: MEDICARE

## 2023-09-20 ENCOUNTER — LAB VISIT (OUTPATIENT)
Dept: LAB | Facility: HOSPITAL | Age: 68
End: 2023-09-20
Payer: MEDICARE

## 2023-09-20 DIAGNOSIS — E10.65 TYPE 1 DIABETES MELLITUS WITH HYPERGLYCEMIA: ICD-10-CM

## 2023-09-20 LAB
ALBUMIN/CREAT UR: 11.9 UG/MG (ref 0–30)
CREAT UR-MCNC: 160 MG/DL (ref 15–325)
MICROALBUMIN UR DL<=1MG/L-MCNC: 19 UG/ML

## 2023-09-20 PROCEDURE — 82570 ASSAY OF URINE CREATININE: CPT | Performed by: NURSE PRACTITIONER

## 2023-09-21 ENCOUNTER — TELEPHONE (OUTPATIENT)
Dept: INTERNAL MEDICINE | Facility: CLINIC | Age: 68
End: 2023-09-21
Payer: MEDICARE

## 2023-09-21 DIAGNOSIS — E10.65 TYPE 1 DIABETES MELLITUS WITH HYPERGLYCEMIA: Primary | ICD-10-CM

## 2023-09-21 RX ORDER — INSULIN ASPART 100 [IU]/ML
100 INJECTION, SOLUTION INTRAVENOUS; SUBCUTANEOUS CONTINUOUS
Qty: 40 ML | Refills: 11 | Status: SHIPPED | OUTPATIENT
Start: 2023-09-21 | End: 2023-11-28 | Stop reason: SDUPTHER

## 2023-09-21 RX ORDER — INSULIN ASPART 100 [IU]/ML
100 INJECTION, SOLUTION INTRAVENOUS; SUBCUTANEOUS CONTINUOUS
Qty: 40 ML | Refills: 11 | Status: SHIPPED | OUTPATIENT
Start: 2023-09-21 | End: 2023-09-21 | Stop reason: SDUPTHER

## 2023-09-21 NOTE — TELEPHONE ENCOUNTER
Not sure what is going on - I resent the prescription just now again to [facility] to see if they can pinpoint issue - they can work the PA and at least let me know what brand - as I've tried both brand and non-branded/generic.   Its ok to substitute brands if they call to ok it. They will send me a message usually.   Humalog or fiasp is ok - these U100 insulins safe for her omnipod also.   Just call to let patient know I sent the rx to an [facility] pharmacy is all so we can get her the vials of insulin as soon as possible.     Thanks,  Abby

## 2023-09-25 ENCOUNTER — TELEPHONE (OUTPATIENT)
Dept: INTERNAL MEDICINE | Facility: CLINIC | Age: 68
End: 2023-09-25
Payer: MEDICARE

## 2023-09-25 DIAGNOSIS — E10.65 TYPE 1 DIABETES MELLITUS WITH HYPERGLYCEMIA: Primary | ICD-10-CM

## 2023-09-25 NOTE — TELEPHONE ENCOUNTER
Can you see her to help troubleshoot? She is a new patient to me as of last week...     She is new to pump therapy. Omnipod 5 and dexcom g6 -   Talked to her over the phone.   Sounds like she is having some connection issues with her CGM and is not syncing - she is staying in automated mode for the most part.     States she is going into the 200's and just concerned.   She also went down to the 40's at one point a few days ago and used baqsimi -     But now down to 150 - 180's - currently stable.     So I'm not really sure what the issue is.   She is eating a lot of bread/carbs - I'm hoping just a temporary connection issue, and she jut needs some 1:1 teaching how to troubleshoot.

## 2023-09-25 NOTE — TELEPHONE ENCOUNTER
----- Message from Bertha Bowman sent at 9/25/2023  3:12 PM CDT -----  Contact: [phone]  Patient called, requested a courtesy call from NP Katerin in regards patient insulin, have some questions about it. Thank you

## 2023-09-25 NOTE — TELEPHONE ENCOUNTER
Spoke with the pt, she say she is so concern about her glucose going so high after she eats breakfast and it take so long to come down, she states she is entering the correct carbs in the omnipod but she is continuously staying high. Pt would like a call if you're available.

## 2023-09-29 ENCOUNTER — HOSPITAL ENCOUNTER (EMERGENCY)
Facility: OTHER | Age: 68
Discharge: HOME | End: 2023-09-29
Attending: EMERGENCY MEDICINE
Payer: MEDICARE

## 2023-09-29 VITALS
BODY MASS INDEX: 24.27 KG/M2 | TEMPERATURE: 98 F | HEART RATE: 84 BPM | WEIGHT: 137 LBS | HEIGHT: 63 IN | SYSTOLIC BLOOD PRESSURE: 94 MMHG | RESPIRATION RATE: 19 BRPM | DIASTOLIC BLOOD PRESSURE: 49 MMHG | OXYGEN SATURATION: 96 %

## 2023-09-29 DIAGNOSIS — N39.0 URINARY TRACT INFECTION WITHOUT HEMATURIA, SITE UNSPECIFIED: ICD-10-CM

## 2023-09-29 DIAGNOSIS — R50.9 FEVER: Primary | ICD-10-CM

## 2023-09-29 PROBLEM — F17.200 TOBACCO DEPENDENCY: Status: ACTIVE | Noted: 2023-09-29

## 2023-09-29 LAB
ALBUMIN SERPL BCP-MCNC: 3.9 G/DL (ref 3.5–5.2)
ALP SERPL-CCNC: 90 U/L (ref 55–135)
ALT SERPL W/O P-5'-P-CCNC: 11 U/L (ref 10–44)
ANION GAP SERPL CALC-SCNC: 13 MMOL/L (ref 8–16)
AST SERPL-CCNC: 14 U/L (ref 10–40)
BACTERIA #/AREA URNS HPF: ABNORMAL /HPF
BASOPHILS # BLD AUTO: 0.04 K/UL (ref 0–0.2)
BASOPHILS NFR BLD: 0.4 % (ref 0–1.9)
BILIRUB SERPL-MCNC: 0.8 MG/DL (ref 0.1–1)
BILIRUB UR QL STRIP: NEGATIVE
BUN SERPL-MCNC: 15 MG/DL (ref 8–23)
CALCIUM SERPL-MCNC: 9.7 MG/DL (ref 8.7–10.5)
CHLORIDE SERPL-SCNC: 102 MMOL/L (ref 95–110)
CLARITY UR: ABNORMAL
CO2 SERPL-SCNC: 21 MMOL/L (ref 23–29)
COLOR UR: YELLOW
CREAT SERPL-MCNC: 1.2 MG/DL (ref 0.5–1.4)
CTP QC/QA: YES
CTP QC/QA: YES
DIFFERENTIAL METHOD: ABNORMAL
EOSINOPHIL # BLD AUTO: 0 K/UL (ref 0–0.5)
EOSINOPHIL NFR BLD: 0 % (ref 0–8)
ERYTHROCYTE [DISTWIDTH] IN BLOOD BY AUTOMATED COUNT: 13 % (ref 11.5–14.5)
EST. GFR  (NO RACE VARIABLE): 49 ML/MIN/1.73 M^2
GLUCOSE SERPL-MCNC: 235 MG/DL (ref 70–110)
GLUCOSE UR QL STRIP: ABNORMAL
HCT VFR BLD AUTO: 48.3 % (ref 37–48.5)
HGB BLD-MCNC: 15.9 G/DL (ref 12–16)
HGB UR QL STRIP: ABNORMAL
IMM GRANULOCYTES # BLD AUTO: 0.04 K/UL (ref 0–0.04)
IMM GRANULOCYTES NFR BLD AUTO: 0.4 % (ref 0–0.5)
KETONES UR QL STRIP: ABNORMAL
LACTATE SERPL-SCNC: 1.6 MMOL/L (ref 0.5–2.2)
LEUKOCYTE ESTERASE UR QL STRIP: ABNORMAL
LYMPHOCYTES # BLD AUTO: 0.9 K/UL (ref 1–4.8)
LYMPHOCYTES NFR BLD: 8.8 % (ref 18–48)
MCH RBC QN AUTO: 29 PG (ref 27–31)
MCHC RBC AUTO-ENTMCNC: 32.9 G/DL (ref 32–36)
MCV RBC AUTO: 88 FL (ref 82–98)
MICROSCOPIC COMMENT: ABNORMAL
MONOCYTES # BLD AUTO: 0.5 K/UL (ref 0.3–1)
MONOCYTES NFR BLD: 5 % (ref 4–15)
NEUTROPHILS # BLD AUTO: 9 K/UL (ref 1.8–7.7)
NEUTROPHILS NFR BLD: 85.4 % (ref 38–73)
NITRITE UR QL STRIP: NEGATIVE
NRBC BLD-RTO: 0 /100 WBC
PH UR STRIP: 6 [PH] (ref 5–8)
PLATELET # BLD AUTO: 251 K/UL (ref 150–450)
PMV BLD AUTO: 10.3 FL (ref 9.2–12.9)
POC MOLECULAR INFLUENZA A AGN: NEGATIVE
POC MOLECULAR INFLUENZA B AGN: NEGATIVE
POCT GLUCOSE: 218 MG/DL (ref 70–110)
POTASSIUM SERPL-SCNC: 4.4 MMOL/L (ref 3.5–5.1)
PROT SERPL-MCNC: 7.5 G/DL (ref 6–8.4)
PROT UR QL STRIP: NEGATIVE
RBC # BLD AUTO: 5.48 M/UL (ref 4–5.4)
RBC #/AREA URNS HPF: 2 /HPF (ref 0–4)
SARS-COV-2 RDRP RESP QL NAA+PROBE: NEGATIVE
SODIUM SERPL-SCNC: 136 MMOL/L (ref 136–145)
SP GR UR STRIP: 1.02 (ref 1–1.03)
SQUAMOUS #/AREA URNS HPF: 12 /HPF
URN SPEC COLLECT METH UR: ABNORMAL
UROBILINOGEN UR STRIP-ACNC: 1 EU/DL
WBC # BLD AUTO: 10.52 K/UL (ref 3.9–12.7)
WBC #/AREA URNS HPF: 20 /HPF (ref 0–5)

## 2023-09-29 PROCEDURE — 80053 COMPREHEN METABOLIC PANEL: CPT | Performed by: EMERGENCY MEDICINE

## 2023-09-29 PROCEDURE — 25000003 PHARM REV CODE 250: Performed by: EMERGENCY MEDICINE

## 2023-09-29 PROCEDURE — 96365 THER/PROPH/DIAG IV INF INIT: CPT

## 2023-09-29 PROCEDURE — 96361 HYDRATE IV INFUSION ADD-ON: CPT

## 2023-09-29 PROCEDURE — 63600175 PHARM REV CODE 636 W HCPCS: Performed by: EMERGENCY MEDICINE

## 2023-09-29 PROCEDURE — 83605 ASSAY OF LACTIC ACID: CPT | Performed by: EMERGENCY MEDICINE

## 2023-09-29 PROCEDURE — 87088 URINE BACTERIA CULTURE: CPT | Performed by: EMERGENCY MEDICINE

## 2023-09-29 PROCEDURE — 81000 URINALYSIS NONAUTO W/SCOPE: CPT | Performed by: EMERGENCY MEDICINE

## 2023-09-29 PROCEDURE — 87086 URINE CULTURE/COLONY COUNT: CPT | Performed by: EMERGENCY MEDICINE

## 2023-09-29 PROCEDURE — 87635 SARS-COV-2 COVID-19 AMP PRB: CPT | Performed by: EMERGENCY MEDICINE

## 2023-09-29 PROCEDURE — 85025 COMPLETE CBC W/AUTO DIFF WBC: CPT | Performed by: EMERGENCY MEDICINE

## 2023-09-29 PROCEDURE — 99284 EMERGENCY DEPT VISIT MOD MDM: CPT | Mod: 25

## 2023-09-29 PROCEDURE — 82962 GLUCOSE BLOOD TEST: CPT

## 2023-09-29 RX ORDER — ACETAMINOPHEN 325 MG/1
650 TABLET ORAL
Status: DISCONTINUED | OUTPATIENT
Start: 2023-09-29 | End: 2023-09-29

## 2023-09-29 RX ORDER — IBUPROFEN 600 MG/1
600 TABLET ORAL
Status: COMPLETED | OUTPATIENT
Start: 2023-09-29 | End: 2023-09-29

## 2023-09-29 RX ORDER — CEPHALEXIN 500 MG/1
500 CAPSULE ORAL EVERY 8 HOURS
Qty: 20 CAPSULE | Refills: 0 | Status: SHIPPED | OUTPATIENT
Start: 2023-09-29 | End: 2023-10-06

## 2023-09-29 RX ORDER — IBUPROFEN 600 MG/1
600 TABLET ORAL EVERY 6 HOURS PRN
Qty: 20 TABLET | Refills: 0 | Status: SHIPPED | OUTPATIENT
Start: 2023-09-29

## 2023-09-29 RX ORDER — ACETAMINOPHEN 500 MG
1000 TABLET ORAL
Status: COMPLETED | OUTPATIENT
Start: 2023-09-29 | End: 2023-09-29

## 2023-09-29 RX ADMIN — CEFTRIAXONE SODIUM 1 G: 1 INJECTION, POWDER, FOR SOLUTION INTRAMUSCULAR; INTRAVENOUS at 03:09

## 2023-09-29 RX ADMIN — ACETAMINOPHEN 1000 MG: 500 TABLET ORAL at 01:09

## 2023-09-29 RX ADMIN — SODIUM CHLORIDE 500 ML: 9 INJECTION, SOLUTION INTRAVENOUS at 01:09

## 2023-09-29 RX ADMIN — IBUPROFEN 600 MG: 600 TABLET, FILM COATED ORAL at 03:09

## 2023-09-29 NOTE — ED TRIAGE NOTES
Patient presents to ED C/O generalized body aches, chills, night sweats, infrequent non productive cough, subjective fever, weakness/fatigue with an onset of this morning. Omnipod noted to left thigh. Dexcom noted to right arm. Blood glucose of 222 obtained via Dexcom. Patient denies UTI symptoms, diarrhea, and SOB.

## 2023-09-29 NOTE — ED NOTES
Patient resting comfortably, visible chest rise and fall, respirations even and unlabored, IVF infusing, call light within reach. Will continue to monitor.

## 2023-09-29 NOTE — DISCHARGE INSTRUCTIONS
Drink plenty of fluids.  Take ibuprofen or Tylenol as needed for pain or fever.  Return to the ER at any time for new or worsening symptoms.

## 2023-09-29 NOTE — ED PROVIDER NOTES
Encounter Date: 2023    SCRIBE #1 NOTE: I, Katalinarin Alberto, am scribing for, and in the presence of,  Denisse Rubalcava MD. I have scribed the following portions of the note - Other sections scribed: HPI, ROS, PE.       History     Chief Complaint   Patient presents with    Weakness     C/o full body pain & weakness onset today +chest pain +sob      Patient is a 68 y.o. female with a PMHx of IDDM and COPD complaining of body aches beginning this morning. She also complains of weakness, chills, subjective fever, congestion, and mild shortness of breath. She denies headache, sore throat, vomiting, and diarrhea. She reports taking tylenol. She states that she quit smoking 4 days ago, and is unsure if this is a COPD flare-up. She reports no surgical history. This is the extent of the patient's complaints at this time.    The history is provided by the patient and medical records. No  was used.     Review of patient's allergies indicates:  No Known Allergies  Past Medical History:   Diagnosis Date    Diabetes mellitus     Diabetes mellitus, type 2     Headache(784.0)      Past Surgical History:   Procedure Laterality Date    BREAST BIOPSY      GUM SURGERY       Family History   Problem Relation Age of Onset    Diabetes Maternal Aunt     Diabetes Son     Breast cancer Maternal Grandmother     Migraines Neg Hx      Social History     Tobacco Use    Smoking status: Some Days     Current packs/day: 0.00     Types: Cigarettes     Last attempt to quit: 2014     Years since quittin.2    Smokeless tobacco: Never   Substance Use Topics    Alcohol use: No    Drug use: No     Review of Systems   Constitutional:  Positive for chills and fever.   HENT:  Positive for congestion. Negative for sore throat.    Eyes:  Negative for visual disturbance.   Respiratory:  Positive for shortness of breath. Negative for cough.    Cardiovascular:  Negative for chest pain and palpitations.   Gastrointestinal:   Negative for abdominal pain, diarrhea and vomiting.   Genitourinary:  Negative for decreased urine volume, dysuria and vaginal discharge.   Musculoskeletal:  Positive for myalgias and neck pain. Negative for joint swelling and neck stiffness.   Skin:  Negative for rash and wound.   Neurological:  Negative for weakness, numbness and headaches.   Psychiatric/Behavioral:  Negative for confusion.        Physical Exam     Initial Vitals [09/29/23 0040]   BP Pulse Resp Temp SpO2   (!) 128/55 (!) 111 18 99.8 °F (37.7 °C) 96 %      MAP       --         Physical Exam    Nursing note and vitals reviewed.  Constitutional: She appears well-developed and well-nourished. She appears distressed.   HENT:   Head: Normocephalic and atraumatic.   Mouth/Throat: Oropharynx is clear and moist. No oropharyngeal exudate.   Eyes: Conjunctivae and EOM are normal. Pupils are equal, round, and reactive to light.   Neck: Neck supple.   Normal range of motion.  Cardiovascular:  Normal heart sounds.   Tachycardia present.         No murmur heard.  Pulmonary/Chest: Breath sounds normal. No respiratory distress. She has no wheezes. She has no rhonchi. She has no rales.   Abdominal: Abdomen is soft. There is no abdominal tenderness. There is no rebound and no guarding.   Musculoskeletal:         General: No tenderness or edema.      Cervical back: Normal range of motion and neck supple.     Neurological: She is alert and oriented to person, place, and time. She has normal strength. GCS score is 15. GCS eye subscore is 4. GCS verbal subscore is 5. GCS motor subscore is 6.   Skin: Skin is warm and dry. No rash noted.   Psychiatric: She has a normal mood and affect. Thought content normal.         ED Course   Procedures  Labs Reviewed   CBC W/ AUTO DIFFERENTIAL - Abnormal; Notable for the following components:       Result Value    RBC 5.48 (*)     Gran # (ANC) 9.0 (*)     Lymph # 0.9 (*)     Gran % 85.4 (*)     Lymph % 8.8 (*)     All other components  within normal limits   COMPREHENSIVE METABOLIC PANEL - Abnormal; Notable for the following components:    CO2 21 (*)     Glucose 235 (*)     eGFR 49 (*)     All other components within normal limits   URINALYSIS, REFLEX TO URINE CULTURE - Abnormal; Notable for the following components:    Appearance, UA Hazy (*)     Glucose, UA 1+ (*)     Ketones, UA 2+ (*)     Occult Blood UA Trace (*)     Leukocytes, UA 2+ (*)     All other components within normal limits    Narrative:     Specimen Source->Urine   URINALYSIS MICROSCOPIC - Abnormal; Notable for the following components:    WBC, UA 20 (*)     Bacteria Few (*)     All other components within normal limits    Narrative:     Specimen Source->Urine   POCT GLUCOSE - Abnormal; Notable for the following components:    POCT Glucose 218 (*)     All other components within normal limits   CULTURE, URINE   LACTIC ACID, PLASMA   SARS-COV-2 RDRP GENE   POCT INFLUENZA A/B MOLECULAR   POCT GLUCOSE MONITORING CONTINUOUS     EKG Readings: (Independently Interpreted)   Emergent evaluation of 68-year-old female who presents with complaint of body aches and weakness x1 day.  Vital signs revealed tachycardia, afebrile.  Patient was warm during my exam though, and did spike a temperature.  I suspect all of her symptoms are related to fever.  I was suspicious for COVID or flu, these tests are reviewed and negative.  Lactic acid was not elevated.  CBC shows no leukocytosis, there is a relative granulocytosis.  CMP showed no major electrolyte disturbance or renal insufficiency, there is mild hyperglycemia without anion gap.  Urinalysis showed 20 wbc's per high-powered field.  She is treated with IV fluids, Rocephin, antipyretics with improvement.  Ultimately patient was discharged in good condition and encouraged close follow-up with PCP, given strict return precautions.  She is given prescription for Keflex to cover for UTI, but is also encouraged to recheck a COVID test at home in 1-2  days.       Imaging Results              X-Ray Chest AP Portable (Final result)  Result time 09/29/23 02:22:22      Final result by Paige Stallings MD (09/29/23 02:22:22)                   Impression:      Please see above.      Electronically signed by: Paige Stallings MD  Date:    09/29/2023  Time:    02:22               Narrative:    EXAMINATION:  XR CHEST AP PORTABLE    CLINICAL HISTORY:  Fever, unspecified    TECHNIQUE:  Single frontal view of the chest was performed.    COMPARISON:  5/29/2022    FINDINGS:  The cardiomediastinal silhouette is within normal limits of size and configuration.  Mediastinal structures are midline.  The lungs are symmetrically expanded with bibasilar increased interstitial attenuation, similar to prior exam.  Findings could relate to artifact from artifact from shallow inspiratory effort and patient body habitus versus superimposed component of interstitial edema or pneumonitis.  No confluent airspace consolidation, substantial volume of pleural fluid or pneumothorax identified.  Osseous structures intact.                                    X-Rays:   Independently Interpreted Readings:   Other Readings:  No infiltrates, effusion, or pneumothorax. No acute process. Will defer to the Radiologist's reading.         Medications   sodium chloride 0.9% bolus 500 mL 500 mL (0 mLs Intravenous Stopped 9/29/23 0217)   acetaminophen tablet 1,000 mg (1,000 mg Oral Given 9/29/23 0114)   cefTRIAXone (ROCEPHIN) 1 g in dextrose 5 % in water (D5W) 100 mL IVPB (MB+) (0 g Intravenous Stopped 9/29/23 0401)   ibuprofen tablet 600 mg (600 mg Oral Given 9/29/23 0328)     Medical Decision Making  Amount and/or Complexity of Data Reviewed  Labs: ordered.  Radiology: ordered.    Risk  OTC drugs.  Prescription drug management.            Scribe Attestation:   Scribe #1: I performed the above scribed service and the documentation accurately describes the services I performed. I attest to the accuracy of the  note.              Physician Attestation for Scribe: I, Denisse Rubalcava, reviewed documentation as scribed in my presence, which is both accurate and complete.            Clinical Impression:   Final diagnoses:  [R50.9] Fever (Primary)  [N39.0] Urinary tract infection without hematuria, site unspecified        ED Disposition Condition    Discharge Stable          ED Prescriptions       Medication Sig Dispense Start Date End Date Auth. Provider    cephALEXin (KEFLEX) 500 MG capsule Take 1 capsule (500 mg total) by mouth every 8 (eight) hours. for 7 days 20 capsule 9/29/2023 10/6/2023 Denisse Rubalcava MD    ibuprofen (ADVIL,MOTRIN) 600 MG tablet Take 1 tablet (600 mg total) by mouth every 6 (six) hours as needed for Pain or Temperature greater than (100). 20 tablet 9/29/2023 -- Denisse Rubalcava MD          Follow-up Information       Follow up With Specialties Details Why Contact Info    Pamela Turner NP Urgent Care Schedule an appointment as soon as possible for a visit   [address] [facility] [address]  [phone]      [facility] - Emergency Dept Emergency Medicine  As needed, If symptoms worsen [address] [facility] [address] [phone]             Denisse Rubalcava MD  09/29/23 0994

## 2023-09-30 LAB — BACTERIA UR CULT: ABNORMAL

## 2023-10-03 ENCOUNTER — CLINICAL SUPPORT (OUTPATIENT)
Dept: DIABETES | Facility: CLINIC | Age: 68
End: 2023-10-03
Payer: MEDICARE

## 2023-10-03 DIAGNOSIS — E10.65 TYPE 1 DIABETES MELLITUS WITH HYPERGLYCEMIA: ICD-10-CM

## 2023-10-03 PROCEDURE — G0108 PR DIAB MANAGE TRN  PER INDIV: ICD-10-PCS | Mod: S$GLB,,, | Performed by: DIETITIAN, REGISTERED

## 2023-10-03 PROCEDURE — G0108 DIAB MANAGE TRN  PER INDIV: HCPCS | Mod: S$GLB,,, | Performed by: DIETITIAN, REGISTERED

## 2023-10-03 NOTE — PROGRESS NOTES
Diabetes Care Specialist Progress Note  Author: Sujatha Gutierrez RD, CDE  Date: 10/3/2023    Program Intake  Reason for Diabetes Program Visit:: Initial Diabetes Assessment  Current diabetes risk level:: moderate  In the last 12 months, have you:: used emergency room services  Was the ER or hospital admission related to diabetes?: No    Lab Results   Component Value Date    HGBA1C 7.8 (H) 09/20/2023       Clinical    Problem Review  Reviewed Problem List with Patient: yes  Active comorbidities affecting diabetes self-care.: yes  Comorbidities: Retinopathy  Reviewed health maintenance: yes    Clinical Assessment  Current Diabetes Treatment: Insulin pump, Insulin  Have you ever experienced hypoglycemia (low blood sugar)?: yes  Are you able to tell when your blood sugar is low?: No  How do you treat hypoglycemia (low blood sugar)?: 1/2 can soda/fruit juice, 5-6 pieces of hard candy  Have you ever experienced hyperglycemia (high blood sugar)?: yes  Are you able to tell when your blood sugar is high?: No (comment)    Medication Information  How do you obtain your medications?: Patient drives  How many days a week do you miss your medications?: Never  Do you sometimes have difficulty refilling your medications?: No  Medication adherence impacting ability to self-manage diabetes?: No    Labs  Do you have regular lab work to monitor your medications?: Yes  Type of Regular Lab Work: A1c    Nutritional Status  Diet: Regular  Change in appetite?: No  Dentation:: Intact  Recent Changes in Weight: No Recent Weight Change  Current nutritional status an area of need that is impacting patient's ability to self-manage diabetes?: No    Additional Social History    Support  Does anyone support you with your diabetes care?: yes  Who supports you?: family member, self  Who takes you to your medical appointments?: self  Does the current support meet the patient's needs?: Yes  Is Support an area impacting ability to self-manage diabetes?:  No    Access to Mass Media & Technology  Does the patient have access to any of the following devices or technologies?: Smart phone  Media or technology needs impacting ability to self-manage diabetes?: No    Cognitive/Behavioral Health  Alert and Oriented: Yes  Difficulty Thinking: No  Requires Prompting: No  Requires assistance for routine expression?: No  Cognitive or behavioral barriers impacting ability to self-manage diabetes?: No    Culture/Tenriism  Culture or Buddhism beliefs that may impact ability to access healthcare: No    Communication  Language preference: English  Hearing Problems: No  Vision Problems: No  Communication needs impacting ability to self-manage diabetes?: No    Health Literacy  Preferred Learning Method: Face to Face  How often do you need to have someone help you read instructions, pamphlets, or written material from your doctor or pharmacy?: Never  Health literacy needs impacting ability to self-manage diabetes?: No      Diabetes Self-Management Skills Assessment    Diabetes Disease Process/Treatment Options  Patient/caregiver able to state what happens when someone has diabetes.: yes  Patient/caregiver knows what type of diabetes they have.: yes  Diabetes Type : Type I  Diabetes Disease Process/Treatment Options: Skills Assessment Completed: Yes  Assessment indicates:: Adequate understanding  Area of need?: No    Nutrition/Healthy Eating  Method of carbohydrate measurement:: carb counting/reading labels  Patient can identify foods that impact blood sugar.: yes  Patient-identified foods:: sweets, starches (bread, pasta, rice, cereal), starchy vegetables (corn, peas, beans), soda, fruit/fruit juice, milk  Nutrition/Healthy Eating Skills Assessment Completed:: Yes  Assessment indicates:: Adequate understanding  Area of need?: No    Physical Activity/Exercise  Patient's daily activity level:: moderately active  Patient can identify forms of physical activity.: yes  Stated forms of  physical activity:: any movement performed by muscles that uses energy, housework  Patient can identify reasons why exercise/physical activity is important in diabetes management.: yes  Identified reasons:: lowers blood glucose, blood pressure, and cholesterol  Physical Activity/Exercise Skills Assessment Completed: : Yes  Assessment indicates:: Adequate understanding  Area of need?: No    Medications  Patient is able to describe current diabetes management routine.: yes  Diabetes management routine:: insulin, insulin pump  Patient is able to identify current diabetes medications, dosages, and appropriate timing of medications.: yes  Patient understands the purpose of the medications taken for diabetes.: yes  Patient reports problems or concerns with current medication regimen.: no  Medication Skills Assessment Completed:: Yes  Assessment indicates:: Adequate understanding  Area of need?: No    Home Blood Glucose Monitoring  Patient states that blood sugar is checked at home daily.: yes  Monitoring Method:: personal continuous glucose monitor  Personal CGM type:: Dexcom G6  Patient is able to use personal CGM appropriately.: yes  Home Blood Glucose Monitoring Skills Assessment Completed: : Yes  Assessment indicates:: Adequate understanding  Area of need?: No    Acute Complications  Patient is able to identify types of acute complications: Yes  Patient Identified:: Hypoglycemia, Hyperglycemia  Patient is able to state the basic meaning of hypoglycemia?: Yes  Patient can identify general symptoms of hypoglycemia: yes  Patient identified:: shakiness  Able to state proper treatment of hypoglycemia?: yes  Patient identified:: 1/2 can soda/fruit juice, 5-6 pieces of hard candy  Patient is able to state the basic meaning of hyperglycemia?: Yes  Patient able to state proper treatment of hyperglycemia?: yes  Patient identified:: contact provider, take medication as recommended  Acute Complications Skills Assessment  Completed: : Yes  Assessment indicates:: Adequate understanding  Area of need?: No    Chronic Complications  Patient can identify major chronic complications of diabetes.: yes  Stated chronic complications:: heart disease/heart attack, kidney disease, neuropathy/nerve damage, retinopathy  Patient can identify ways to prevent or delay diabetes complications.: yes  Stated ways to prevent complications:: controlling blood sugar  Chronic Complications Skills Assessment Completed: : Yes  Assessment indicates:: Adequate understanding  Area of need?: No    Psychosocial/Coping  Patient can identify ways of coping with chronic disease.: yes  Patient-stated ways of coping with chronic disease:: support from loved ones  Psychosocial/Coping Skills Assessment Completed: : Yes  Assessment indicates:: Adequate understanding  Area of need?: No      Assessment Summary and Plan    Based on today's diabetes care assessment, the following areas of need were identified:          10/3/2023    12:01 AM   Social   Support No   Access to Mass Media/Tech No   Cognitive/Behavioral Health No   Culture/Sabianist No   Communication No   Health Literacy No            10/3/2023    12:01 AM   Clinical   Medication Adherence No   Nutritional Status No            10/3/2023    12:01 AM   Diabetes Self-Management Skills   Diabetes Disease Process/Treatment Options No   Nutrition/Healthy Eating No   Physical Activity/Exercise No   Medication No   Home Blood Glucose Monitoring No   Acute Complications No   Chronic Complications No   Psychosocial/Coping No      Patient was recently trained on Omnipod 5 with an Omnipod . She was recently referred to Abby Conklin who was unable to review Dexcom or Glooko data because patient was not linked up. Patient was also having some issues with Dexcom staying connected to the Omnipod. I was able to get patient linked to clarity and glooko today.    Dexcom UN: [email or web address], PW: [email or web address]  Glooko  UN; [email or web address], PW: [email or web address]  [employer]: UN: [email or web address]; PW: [email or web address]    Reviewed overall use of the Omnipod 5. Current settings:   Basal rate 0.65 units/hour.   Carb ratio 1:15   ISF is at 1:55   Target 120/Correct above 120  IOB 3 hours    Dexcom is currently linked to Omnipod. Reviewed troubleshooting tips if Dexcom is having trouble staying connected. No further questions or concerns today. Will follow with provider in 4 weeks.    Today's interventions were provided through individual discussion, instruction, and written materials were provided.      Patient verbalized understanding of instruction and written materials.  Pt was able to return back demonstration of instructions today. Patient understood key points, needs reinforcement and further instruction.     Diabetes Self-Management Care Plan:    Today's Diabetes Self-Management Care Plan was developed with Catalina's input. Catalina has agreed to work toward the following goal(s) to improve his/her overall diabetes control.      Care Plan: Diabetes Management   Updates made since 9/3/2023 12:00 AM        Problem: Medications         Long-Range Goal: Use Omnipod 5/Dexcom as prescribed    Start Date: 10/3/2023   Expected End Date: 4/3/2024   Priority: High   Barriers: No Barriers Identified     Task: Reviewed use of the Omnipod 5 system Completed 10/3/2023            Follow Up Plan     Follow up in about 6 months (around 4/3/2024).    Today's care plan and follow up schedule was discussed with patient.  Catalina verbalized understanding of the care plan, goals, and agrees to follow up plan.        The patient was encouraged to communicate with his/her health care provider/physician and care team regarding his/her condition(s) and treatment.  I provided the patient with my contact information today and encouraged to contact me via phone or [facility] Patient Portal as needed.     Length of Visit   Total Time: 60 Minutes

## 2023-10-31 ENCOUNTER — TELEPHONE (OUTPATIENT)
Dept: INTERNAL MEDICINE | Facility: CLINIC | Age: 68
End: 2023-10-31
Payer: MEDICARE

## 2023-10-31 NOTE — TELEPHONE ENCOUNTER
----- Message from Rosey Calix sent at 10/31/2023 12:18 PM CDT -----  Contact: Pt [phone]  Patient is returning a phone call.  Who left a message for the patient: Klarissa  Does patient know what this is regarding:  Yes  Would you like a call back, or a response through your [facility] portal?: call  Comments:

## 2023-11-02 ENCOUNTER — TELEPHONE (OUTPATIENT)
Dept: INTERNAL MEDICINE | Facility: CLINIC | Age: 68
End: 2023-11-02
Payer: MEDICARE

## 2023-11-02 NOTE — TELEPHONE ENCOUNTER
----- Message from Emily Benoit sent at 11/2/2023 12:47 PM CDT -----  Contact: [phone]  Pt does not know how to do virtual appts. She want to rs her appt from yesterday to in person. She wants to be seen before Thanksgiving. Please call pt.

## 2023-11-02 NOTE — TELEPHONE ENCOUNTER
Appointment has been rescheduled with pt , she had trouble using the portal and wanted in clinic visit .

## 2023-11-08 ENCOUNTER — TELEPHONE (OUTPATIENT)
Dept: INTERNAL MEDICINE | Facility: CLINIC | Age: 68
End: 2023-11-08
Payer: MEDICARE

## 2023-11-08 NOTE — TELEPHONE ENCOUNTER
----- Message from Stella Day MA sent at 11/8/2023 11:42 AM CST -----  Contact: [phone]    ----- Message -----  From: Ryan Mcgarry  Sent: 11/8/2023  11:42 AM CST  To: Katerin STARKEY Staff    [email or web address]     Patient is calling for Medical Advice regarding: pt says her insulin pump cart,automated,BT (OMNIPOD 5 G6 PODS, GEN 5,) Crtg isnt giving her a good reading     How long has patient had these symptoms:    Pharmacy name and phone#:    Would like response via [employer]:  call back   Comments:

## 2023-11-28 ENCOUNTER — OFFICE VISIT (OUTPATIENT)
Dept: INTERNAL MEDICINE | Facility: CLINIC | Age: 68
End: 2023-11-28
Payer: MEDICARE

## 2023-11-28 VITALS
HEIGHT: 63 IN | TEMPERATURE: 98 F | WEIGHT: 142.19 LBS | SYSTOLIC BLOOD PRESSURE: 120 MMHG | HEART RATE: 82 BPM | RESPIRATION RATE: 15 BRPM | OXYGEN SATURATION: 96 % | BODY MASS INDEX: 25.2 KG/M2 | DIASTOLIC BLOOD PRESSURE: 80 MMHG

## 2023-11-28 DIAGNOSIS — E10.8 DIABETES MELLITUS TYPE 1, WITH COMPLICATION, ON LONG TERM INSULIN PUMP: ICD-10-CM

## 2023-11-28 DIAGNOSIS — E10.65 TYPE 1 DIABETES MELLITUS WITH HYPERGLYCEMIA: Primary | ICD-10-CM

## 2023-11-28 DIAGNOSIS — Z96.41 DIABETES MELLITUS TYPE 1, WITH COMPLICATION, ON LONG TERM INSULIN PUMP: ICD-10-CM

## 2023-11-28 PROCEDURE — 3061F PR NEG MICROALBUMINURIA RESULT DOCUMENTED/REVIEW: ICD-10-PCS | Mod: CPTII,S$GLB,, | Performed by: NURSE PRACTITIONER

## 2023-11-28 PROCEDURE — 1159F PR MEDICATION LIST DOCUMENTED IN MEDICAL RECORD: ICD-10-PCS | Mod: CPTII,S$GLB,, | Performed by: NURSE PRACTITIONER

## 2023-11-28 PROCEDURE — 3008F PR BODY MASS INDEX (BMI) DOCUMENTED: ICD-10-PCS | Mod: CPTII,S$GLB,, | Performed by: NURSE PRACTITIONER

## 2023-11-28 PROCEDURE — 3061F NEG MICROALBUMINURIA REV: CPT | Mod: CPTII,S$GLB,, | Performed by: NURSE PRACTITIONER

## 2023-11-28 PROCEDURE — 3079F PR MOST RECENT DIASTOLIC BLOOD PRESSURE 80-89 MM HG: ICD-10-PCS | Mod: CPTII,S$GLB,, | Performed by: NURSE PRACTITIONER

## 2023-11-28 PROCEDURE — 1126F PR PAIN SEVERITY QUANTIFIED, NO PAIN PRESENT: ICD-10-PCS | Mod: CPTII,S$GLB,, | Performed by: NURSE PRACTITIONER

## 2023-11-28 PROCEDURE — 3074F SYST BP LT 130 MM HG: CPT | Mod: CPTII,S$GLB,, | Performed by: NURSE PRACTITIONER

## 2023-11-28 PROCEDURE — 99999 PR PBB SHADOW E&M-EST. PATIENT-LVL IV: CPT | Mod: PBBFAC,,, | Performed by: NURSE PRACTITIONER

## 2023-11-28 PROCEDURE — 3079F DIAST BP 80-89 MM HG: CPT | Mod: CPTII,S$GLB,, | Performed by: NURSE PRACTITIONER

## 2023-11-28 PROCEDURE — 1160F PR REVIEW ALL MEDS BY PRESCRIBER/CLIN PHARMACIST DOCUMENTED: ICD-10-PCS | Mod: CPTII,S$GLB,, | Performed by: NURSE PRACTITIONER

## 2023-11-28 PROCEDURE — 3288F FALL RISK ASSESSMENT DOCD: CPT | Mod: CPTII,S$GLB,, | Performed by: NURSE PRACTITIONER

## 2023-11-28 PROCEDURE — 1159F MED LIST DOCD IN RCRD: CPT | Mod: CPTII,S$GLB,, | Performed by: NURSE PRACTITIONER

## 2023-11-28 PROCEDURE — 3288F PR FALLS RISK ASSESSMENT DOCUMENTED: ICD-10-PCS | Mod: CPTII,S$GLB,, | Performed by: NURSE PRACTITIONER

## 2023-11-28 PROCEDURE — 3008F BODY MASS INDEX DOCD: CPT | Mod: CPTII,S$GLB,, | Performed by: NURSE PRACTITIONER

## 2023-11-28 PROCEDURE — 1126F AMNT PAIN NOTED NONE PRSNT: CPT | Mod: CPTII,S$GLB,, | Performed by: NURSE PRACTITIONER

## 2023-11-28 PROCEDURE — 1101F PR PT FALLS ASSESS DOC 0-1 FALLS W/OUT INJ PAST YR: ICD-10-PCS | Mod: CPTII,S$GLB,, | Performed by: NURSE PRACTITIONER

## 2023-11-28 PROCEDURE — 95251 PR GLUCOSE MONITOR, 72 HOUR, PHYS INTERP: ICD-10-PCS | Mod: S$GLB,,, | Performed by: NURSE PRACTITIONER

## 2023-11-28 PROCEDURE — 99999 PR PBB SHADOW E&M-EST. PATIENT-LVL IV: ICD-10-PCS | Mod: PBBFAC,,, | Performed by: NURSE PRACTITIONER

## 2023-11-28 PROCEDURE — 3051F PR MOST RECENT HEMOGLOBIN A1C LEVEL 7.0 - < 8.0%: ICD-10-PCS | Mod: CPTII,S$GLB,, | Performed by: NURSE PRACTITIONER

## 2023-11-28 PROCEDURE — 1160F RVW MEDS BY RX/DR IN RCRD: CPT | Mod: CPTII,S$GLB,, | Performed by: NURSE PRACTITIONER

## 2023-11-28 PROCEDURE — 95251 CONT GLUC MNTR ANALYSIS I&R: CPT | Mod: S$GLB,,, | Performed by: NURSE PRACTITIONER

## 2023-11-28 PROCEDURE — 99214 OFFICE O/P EST MOD 30 MIN: CPT | Mod: S$GLB,,, | Performed by: NURSE PRACTITIONER

## 2023-11-28 PROCEDURE — 1101F PT FALLS ASSESS-DOCD LE1/YR: CPT | Mod: CPTII,S$GLB,, | Performed by: NURSE PRACTITIONER

## 2023-11-28 PROCEDURE — 3051F HG A1C>EQUAL 7.0%<8.0%: CPT | Mod: CPTII,S$GLB,, | Performed by: NURSE PRACTITIONER

## 2023-11-28 PROCEDURE — 3066F PR DOCUMENTATION OF TREATMENT FOR NEPHROPATHY: ICD-10-PCS | Mod: CPTII,S$GLB,, | Performed by: NURSE PRACTITIONER

## 2023-11-28 PROCEDURE — 99214 PR OFFICE/OUTPT VISIT, EST, LEVL IV, 30-39 MIN: ICD-10-PCS | Mod: S$GLB,,, | Performed by: NURSE PRACTITIONER

## 2023-11-28 PROCEDURE — 3074F PR MOST RECENT SYSTOLIC BLOOD PRESSURE < 130 MM HG: ICD-10-PCS | Mod: CPTII,S$GLB,, | Performed by: NURSE PRACTITIONER

## 2023-11-28 PROCEDURE — 3066F NEPHROPATHY DOC TX: CPT | Mod: CPTII,S$GLB,, | Performed by: NURSE PRACTITIONER

## 2023-11-28 RX ORDER — PEN NEEDLE, DIABETIC 30 GX3/16"
1 NEEDLE, DISPOSABLE MISCELLANEOUS 4 TIMES DAILY PRN
Qty: 90 EACH | Refills: 1 | Status: SHIPPED | OUTPATIENT
Start: 2023-11-28

## 2023-11-28 RX ORDER — INSULIN ASPART 100 [IU]/ML
100 INJECTION, SOLUTION INTRAVENOUS; SUBCUTANEOUS CONTINUOUS
Qty: 40 ML | Refills: 11 | Status: SHIPPED | OUTPATIENT
Start: 2023-11-28

## 2023-11-28 RX ORDER — INSULIN PMP CART,AUT,G6/7,CNTR
1 EACH SUBCUTANEOUS
Qty: 30 EACH | Refills: 3 | Status: SHIPPED | OUTPATIENT
Start: 2023-11-28 | End: 2024-01-10 | Stop reason: SDUPTHER

## 2023-11-28 RX ORDER — ACETAMINOPHEN AND CODEINE PHOSPHATE 120; 12 MG/5ML; MG/5ML
SOLUTION ORAL
COMMUNITY
Start: 2023-10-10

## 2023-11-28 RX ORDER — INSULIN GLARGINE 100 [IU]/ML
22 INJECTION, SOLUTION SUBCUTANEOUS DAILY
Qty: 15 ML | Refills: 1 | Status: SHIPPED | OUTPATIENT
Start: 2023-11-28

## 2023-11-28 NOTE — PATIENT INSTRUCTIONS
Continue on omnipod 5 and dexcom g6 - stay in automated mode.   Bolus before your eat - enter in your carbs.     Use the carb calculator -     For low blood sugar - remember to keep glucose tablets on hand. You can purchase these at the pharmacy check out desk/over the counter.   If blood sugar is less than 70, take/eat 2 - 3 tablets quickly to bring your sugar up.   Always keep 15 grams of Quick acting carbohydrates on hand to eat/drink if your sugar is low - examples are 1/2 cup of juice, coke, or crackers, granola bars.   Remember to eat meals frequently to prevent low blood blood sugars.      Please remember to change insulin pump set/pump site, refill reservoir every 3 days according to  instructions. Longer duration between pump site changes can result in high blood glucose.     Please bolus insulin 15 minutes before you eat meals to avoid spikes and lows in blood glucose.     Please bolus with every snack outside of daily meals.     If you are having sensor issues, please check glucose by finger sticks and input the glucose levels into the pump.     Contact the supplier if you are having sensors or pump issues for troubleshooting and additional supplies.              Pump Back Up Plan:   1.  In case you are not certain the pump or tubing is working correctly, use this plan.  2.  Stop the pump and disconnect the tubing and insertion set.  3.  If you will be off the pump for more than 1-2 hours without a basal rate, you must correct with Novolog or switch to a long acting insulin plan.    4.  If the insulin pump is non functional and discontinued for anticipated more than 20 hours, please give daily injections of:  Long acting insulin Lantus 22 units daily -   Short acting insulin for meals according to carb ratios 1: 13 (1 unit of insulin for every 13 grams of carbs)   5.  When the insulin pump is restarted, do not restart basal rates until at least 22 hours after the last long acting insulin  "injection. You can set a 0% temporary basal setting that will last until this time and use your pump to bolus for meals and correction.  6.  If glucose continues to rise, or no fresh backup insulin is available, or unable to perform the above instructions. Please be evaluated in the ED.        For any technical insulin pump issues, Contact the insulin pump company representative to troubleshoot the problems. (Help numbers are on the back of the pump device)               Hypoglycemia Treatment - The "15-15 Rule".     If you experience an episode of hypoglycemia (glucose less than 70), follow the 15-15 rule.     Have 15 grams of carbohydrate to raise your blood sugar and recheck it after 15 minutes. If its still below 70 mg/dL, have another serving.     Repeat these steps until your blood sugar is at least 70 mg/dL. Once your blood sugar is back to normal, eat a meal or snack to make sure it doesnt lower again.     This may be:     4 ounces (1/2 cup) of juice or regular soda (not diet)  1 tablespoon of sugar, honey, or corn syrup  Hard candies, jellybeans, or gumdrops--see food label for how many to consume  Make a note about any episodes of low blood sugar and talk with your health care team about why it happened. They can suggest ways to avoid low blood sugar in the future.      Many people tend to want to eat as much as they can until they feel better. This can cause blood sugar levels to shoot way up. Using the step-wise approach of the "15-15 Rule" can help you avoid this, preventing high blood sugar levels.     Note:  When treating a low, the choice of carbohydrate source is important. Complex carbohydrates, or foods that contain fats along with carbs (like chocolate) can slow the absorption of glucose and should not be used to treat an emergency low.     For more information, visit:  [email or web address]            Please " notify your physician or me if  you have persistent low blood glucose <70 or persistent elevated glucose >250 as soon as possible in addition to the steps suggested above.

## 2023-11-28 NOTE — PROGRESS NOTES
68 y.o. female here for routine follow up visit for management of T1dm -   Last seen September 2023 - those notes are below.     A1c is @ 7.8% - done September 2023 -   She continues on omnipod 5 with dexcom g6 -   She enjoys using her pump - was switched to pump from another endocrinologist earlier this year.     [place] report -   Total daily dose is 37 units/day -  56% is coming from basal rate.   44% is coming from bolusing -   Giving around 7 boluses per day -   She admits she will give free boluses (not using calculator) to get her blood sugar   Putting in around 78.8 grams of carbs per day -   Active insulin time is 3 hours -   Basal rate is at 0.65 units/hour -   ISF is at 55 -   Bg correction is 120   Carb ratio is 1: 15   Bg target is 120     Dexcom g6 - downloaded and reviewed today -   Average glucose is 170   Gmi - A1c is estimated at 7.4% -   64% time in range.   <1% low   <1% very lows.   25% highs.   10% very highs.     Notices her glucose is more controlled when she is busy -   She does report lows - has baqsimi in case of an emergency and has used it before.   Was sick a few weeks ago and had to use steroid shots.       HPI: Catalina Mcgarry is a 68 y.o.  female c/I for visit to address Diabetes Type 1  This is the first time I am seeing her for care, follows with Pamela King, NP for primary care needs.   Has seen endocrinologist in the past - Dr. Echeverria in the past but had trouble with follow up. Only seen 1 time.   Was trained with diabetes educator on new insulin pump.      was diagnosed with T1DM during pregnancy in 1978 -   Began on insulin injections at that time, her mother had T2dm -   Her son has type 1 diabetes and is on injections- now pump therapy.   Has never been hospitalized r/t DM. Has never had DKA in the past.   Denies missing doses of DM medication.      On omnipod 5 - began/trained with Omnipod educator - about 1 month ago now.   Omnipod 5 - pods come from [facility].   Using novolog U100   Is in automated mode -   Bassal rate is at 0. 65 units/hour -   Target glucose is 120 -   Correct above 120 -   Insulin to carb ratio 1: 15 -   ICF - 1: 55   Duration of action - 3 hours.      [facility] report - we could not program her pdm    Has to re-register it - so was unable to download a [facility] report.      Dexcom G6 - gets supplies from [facility] [employer].   Shows gmi is at 7.1% -   Average bg of 157   73% time in range.   1% lows.   0% very low  25% highs.   1% very highs.        Labs reviewed in past - cpeptide >0.01 in 2011 -   Haven't repeated since .      Complications from diabetes and pertinent co-morbidities include:   Negative for DKA.   Has BEEN ON INSULIN FOR 45 + YEARS SINCE DIAGNOSIS.   -negative for diabetic neuropathy.   -negative for nephropathy.   No microalbuminuria.   Eyes:  + Diabetic retinopathy   CV: Denies history of MI nor stroke.   CAD: Denies.  Takes aspirin 81mg tablet daily  BP: has history of HTN  Statin: Not taking  ACE/ARB: Not taking     Social History           Tobacco Use   Smoking Status Former    Current packs/day: 0.00    Types: Cigarettes    Quit date: 2014    Years since quittin.1   Smokeless Tobacco Not on file      Past medical History:        Past Medical History:   Diagnosis Date    Diabetes mellitus      Diabetes mellitus, type 2      Headache(784.0)        Family hx:         Family History   Problem Relation Age of Onset    Diabetes Maternal Aunt      Diabetes Son      Breast cancer Maternal Grandmother      Migraines Neg Hx        Current meds:   Current Outpatient Medications:     acetaminophen (TYLENOL) 500 MG tablet, Take 2 tablets (1,000 mg total) by mouth every 8 (eight) hours as needed for Pain., Disp: 30 tablet, Rfl: 0    ascorbic acid (VITAMIN C) 500 MG tablet, Take 500 mg by mouth once daily., Disp: , Rfl:     blood-glucose sensor (DEXCOM G6 SENSOR) Eileen, 1 each by Misc.(Non-Drug; Combo Route) route every 10 days.  Apply/change sensor to skin every 10 days., Disp: 10 each, Rfl: 3    blood-glucose transmitter (DEXCOM G6 TRANSMITTER) Eileen, 1 each by Misc.(Non-Drug; Combo Route) route Daily. Use transmitter in sensor with G6 system. Change new transmitter every 3 months., Disp: 1 each, Rfl: 3    fluticasone propionate (FLONASE) 50 mcg/actuation nasal spray, 1 spray by Each Nostril route once daily., Disp: , Rfl:     glucagon (BAQSIMI) 3 mg/actuation Spry, 1 each by Nasal route daily as needed (IF GLUCOSE IS LESS THAN 70)., Disp: 2 each, Rfl: 1    ibuprofen (ADVIL,MOTRIN) 600 MG tablet, Take 1 tablet (600 mg total) by mouth every 8 (eight) hours as needed for Pain., Disp: 20 tablet, Rfl: 0    insulin aspart U-100 (NOVOLOG) 100 unit/mL injection, Inject 100 Units into the skin continuous. Gives up to 100 units daily via Omnipod insulin pump - do not directly inject. Only use with insulin pump, Disp: 40 mL, Rfl: 11    insulin pump cart,automated,BT (OMNIPOD 5 G6 PODS, GEN 5,) Crtg, Inject 1 each into the skin every 72 hours. Use with omnipod 5 pdm as directed., Disp: 30 each, Rfl: 3    nicotine (NICODERM CQ) 21 mg/24 hr, Place 1 patch onto the skin once daily., Disp: 14 patch, Rfl: 0    nicotine, polacrilex, (NICORETTE) 2 mg Gum, Take 1 each (2 mg total) by mouth as needed (in place of a cigarette). Max 10-20 pieces per day, Disp: 50 each, Rfl: 0      Current Diabetes medications:   Novolog U100 via omnipod 5 -      Medications Tried and Failed:   Lantus 20 units daily.   Novolog 3 units tid ac meals.   Metformin - Gi issues.      Social:   No Life changes/stressors currently:   Diet: following ADA diet for most part  Meals: 3 per day and snacks.     Exercise: yes - regularly 3 times per week.   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       Standards of care:   Eyes: .Most Recent Eye Exam Date: Not Found  Foot exam: Most Recent Foot Exam  "Date: Not Found   Diabetes education: None.     Vital Signs  There were no vitals taken for this visit.     Pertinent Labs:   Hgba1c         Lab Results   Component Value Date     HGBA1C 10.7 (H) 03/17/2011      Lipid panel No results found for: "CHOL"  No results found for: "HDL"  No results found for: "LDLCALC"  No results found for: "TRIG"  No results found for: "CHOLHDL"   CMP        Glucose   Date Value Ref Range Status   03/31/2013 117 (H) 70 - 110 mg/dl Final            BUN   Date Value Ref Range Status   03/31/2013 11 6 - 20 mg/dl Final            Creatinine   Date Value Ref Range Status   03/31/2013 0.7 0.5 - 1.4 mg/dl Final              eGFR if    Date Value Ref Range Status   03/31/2013 >60 >60 mL/min Final       Comment:       Estimated glomerular filtration rate (eGFR) is normalized to an  average body surface area of 1.73 square meters.  The calculation  used to obtain the eGFR is the adjusted MDRD equation, which factors  patient sex, age, race, and creatinine result.  Since race is unknown  in our information system, the eGFR values for -American  and Non--American patients are given for each creatinine  result.            eGFR if non    Date Value Ref Range Status   03/31/2013 >60 >60 mL/min Final            AST   Date Value Ref Range Status   03/31/2013 11 10 - 40 U/L Final            ALT   Date Value Ref Range Status   03/31/2013 9 (L) 10 - 44 U/L Final      Microalbumin creatinine ratio:         Lab Results   Component Value Date     MICALBCREAT 6.4 02/24/2011         Review Of Systems:   Gen: Appetite good, no weight gain or loss, denies fatigue and weakness. Denies polydipsia.  Skin: Skin is intact and heals well, denies any rashes or hair changes.   EENT: Denies any acute visual disturbances, nor blurred vision.    Resp: Denies SOB or Dyspnea on exertion, denies cough.   Cardiac: Denies chest pain, palpitations, or swelling.   GI: Denies abdominal " pain, nausea or vomiting, diarrhea, or constipation.   /GYN: Denies nocturia, nor burning, frequency or pain on urination.  MS/Neuro: Denies numbness/ tingling in BLE; Gait steady, speech clear  Psych: Denies drug/ETOH abuse, no hx of depression.  Other systems: negative.     Physical Exam:   GENERAL: Well developed, well nourished in appearance.   PSYCH: AAOx3, appropriate mood and affect, pleasant expression, conversant, appears relaxed, well groomed.   EYES: PERRL, Conjunctiva and corneas clear  NECK: Soft and Supple, trachea midline,   CHEST: Even, regular, and unlabored respirations  ABDOMEN: Soft, non-tender, non-distended.   VASCULAR: pedal pulses palpable bilaterally, no edema.  NEURO:  cranial nerves II - XII intact   MUSCULOSKELETAL: Good ROM, equal strength, equal hand grasp, steady gait.   SKIN: Skin warm, dry, and intact     Assessment and Plan of Care:      Diagnoses and all orders for this visit:     Type 1 diabetes mellitus with hyperglycemia  -     Anti-islet cell antibody; Future  -     Comprehensive Metabolic Panel; Future  -     C-Peptide; Future  -     Glutamic Acid Decarboxylase; Future  -     Hemoglobin A1C; Future  -     Lipid Panel; Future  -     Microalbumin/Creatinine Ratio, Urine; Future  -     insulin aspart U-100 (NOVOLOG) 100 unit/mL injection; Inject 100 Units into the skin continuous. Gives up to 100 units daily via Omnipod insulin pump - do not directly inject. Only use with insulin pump  -     glucagon (BAQSIMI) 3 mg/actuation Spry; 1 each by Nasal route daily as needed (IF GLUCOSE IS LESS THAN 70).  -     insulin pump cart,automated,BT (OMNIPOD 5 G6 PODS, GEN 5,) Crtg; Inject 1 each into the skin every 72 hours. Use with omnipod 5 pdm as directed.  -     blood-glucose sensor (DEXCOM G6 SENSOR) Eileen; 1 each by Misc.(Non-Drug; Combo Route) route every 10 days. Apply/change sensor to skin every 10 days.  -     blood-glucose transmitter (DEXCOM G6 TRANSMITTER) Eileen; 1 each by  Misc.(Non-Drug; Combo Route) route Daily. Use transmitter in sensor with G6 system. Change new transmitter every 3 months.     Proliferative diabetic retinopathy of both eyes associated with type 1 diabetes mellitus, unspecified proliferative retinopathy type     Diabetes mellitus type 1, with complication, on long term insulin pump           1. T1DM with hyperglycemia- Hgba1c goal is 7.5% or less without hypoglycemia - 10.7% ---> 7.8%    Keep target bg at 120 -   Basal rate at 0.65 units/hour.   Carb ratio 1: 15 -- decreased it to 1: 13  ISF is at 1: 55   Basal rate no changes.   Continue to bolus before meals. Enter in carbs and use bolus calculator -0   Sent vials and pods for refill to local [facility] pharmacy.   Dexcom g6 - supplies to [email or web address]. send in new rx.   discussed DM, progression of disease, long term complications, CV risk factors and tx options.   Advise compliance with ADA diet and encourage exercise  Reviewed  hypoglycemia, s/s and appropriate tx. Have/get quick acting glucose tablets at hand.   Instructed to monitor Blood glucose 2 - 4x/day and bring meter/ log to every clinic visit.   Gets injections in her eyes - Dr. Herrera.      2. HTN- controlled, continue meds as previously prescribed and monitor.   Needs urine mac lab done.      3. Lipids- LDL goal < 100. Need lipid panel - obtain.      4. Weight - BMI There is no height or weight on file to calculate BMI.   Encourage Ada diet and exercise.      5. Renal Function - stable - no issues.          Follow up in 3 months with labs.                Deleted by: Abby Conklin NP at 9/19/2023  4:15 PM

## 2023-12-12 ENCOUNTER — NURSE TRIAGE (OUTPATIENT)
Dept: ADMINISTRATIVE | Facility: CLINIC | Age: 68
End: 2023-12-12
Payer: MEDICARE

## 2023-12-12 ENCOUNTER — OFFICE VISIT (OUTPATIENT)
Dept: INTERNAL MEDICINE | Facility: CLINIC | Age: 68
End: 2023-12-12
Payer: MEDICARE

## 2023-12-12 ENCOUNTER — TELEPHONE (OUTPATIENT)
Dept: INTERNAL MEDICINE | Facility: CLINIC | Age: 68
End: 2023-12-12

## 2023-12-12 DIAGNOSIS — E10.65 TYPE 1 DIABETES MELLITUS WITH HYPERGLYCEMIA: Primary | ICD-10-CM

## 2023-12-12 DIAGNOSIS — Z96.41 DIABETES MELLITUS TYPE 1, WITH COMPLICATION, ON LONG TERM INSULIN PUMP: ICD-10-CM

## 2023-12-12 DIAGNOSIS — E10.8 DIABETES MELLITUS TYPE 1, WITH COMPLICATION, ON LONG TERM INSULIN PUMP: ICD-10-CM

## 2023-12-12 PROCEDURE — 99999 PR PBB SHADOW E&M-EST. PATIENT-LVL III: ICD-10-PCS | Mod: PBBFAC,,, | Performed by: NURSE PRACTITIONER

## 2023-12-12 PROCEDURE — 1159F PR MEDICATION LIST DOCUMENTED IN MEDICAL RECORD: ICD-10-PCS | Mod: CPTII,S$GLB,, | Performed by: NURSE PRACTITIONER

## 2023-12-12 PROCEDURE — 99215 OFFICE O/P EST HI 40 MIN: CPT | Mod: S$GLB,,, | Performed by: NURSE PRACTITIONER

## 2023-12-12 PROCEDURE — 3051F PR MOST RECENT HEMOGLOBIN A1C LEVEL 7.0 - < 8.0%: ICD-10-PCS | Mod: CPTII,S$GLB,, | Performed by: NURSE PRACTITIONER

## 2023-12-12 PROCEDURE — 95251 CONT GLUC MNTR ANALYSIS I&R: CPT | Mod: S$GLB,,, | Performed by: NURSE PRACTITIONER

## 2023-12-12 PROCEDURE — 99999 PR PBB SHADOW E&M-EST. PATIENT-LVL III: CPT | Mod: PBBFAC,,, | Performed by: NURSE PRACTITIONER

## 2023-12-12 PROCEDURE — 3066F PR DOCUMENTATION OF TREATMENT FOR NEPHROPATHY: ICD-10-PCS | Mod: CPTII,S$GLB,, | Performed by: NURSE PRACTITIONER

## 2023-12-12 PROCEDURE — 95251 PR GLUCOSE MONITOR, 72 HOUR, PHYS INTERP: ICD-10-PCS | Mod: S$GLB,,, | Performed by: NURSE PRACTITIONER

## 2023-12-12 PROCEDURE — 3066F NEPHROPATHY DOC TX: CPT | Mod: CPTII,S$GLB,, | Performed by: NURSE PRACTITIONER

## 2023-12-12 PROCEDURE — 3061F PR NEG MICROALBUMINURIA RESULT DOCUMENTED/REVIEW: ICD-10-PCS | Mod: CPTII,S$GLB,, | Performed by: NURSE PRACTITIONER

## 2023-12-12 PROCEDURE — 99215 PR OFFICE/OUTPT VISIT, EST, LEVL V, 40-54 MIN: ICD-10-PCS | Mod: S$GLB,,, | Performed by: NURSE PRACTITIONER

## 2023-12-12 PROCEDURE — 3061F NEG MICROALBUMINURIA REV: CPT | Mod: CPTII,S$GLB,, | Performed by: NURSE PRACTITIONER

## 2023-12-12 PROCEDURE — 1160F RVW MEDS BY RX/DR IN RCRD: CPT | Mod: CPTII,S$GLB,, | Performed by: NURSE PRACTITIONER

## 2023-12-12 PROCEDURE — 1159F MED LIST DOCD IN RCRD: CPT | Mod: CPTII,S$GLB,, | Performed by: NURSE PRACTITIONER

## 2023-12-12 PROCEDURE — 3051F HG A1C>EQUAL 7.0%<8.0%: CPT | Mod: CPTII,S$GLB,, | Performed by: NURSE PRACTITIONER

## 2023-12-12 PROCEDURE — 1160F PR REVIEW ALL MEDS BY PRESCRIBER/CLIN PHARMACIST DOCUMENTED: ICD-10-PCS | Mod: CPTII,S$GLB,, | Performed by: NURSE PRACTITIONER

## 2023-12-12 RX ORDER — LANCETS 28 GAUGE
1 EACH MISCELLANEOUS
Qty: 200 EACH | Refills: 3 | Status: SHIPPED | OUTPATIENT
Start: 2023-12-12

## 2023-12-12 NOTE — PATIENT INSTRUCTIONS
Stay on insulin pump.   Stay on your dexcom g6 cgm.   If they do not connect - it's ok - for now, stay on pump therapy and go to manual mode.   Still give yourself insulin before your meals.     Meet with Diabetes educator to troubleshoot lack of connection from G6 to omnipod 5 pdm.     In an emergency, if pump failure.   Use back up insulin pens/plan  -     Please remember to change insulin pump set/pump site, refill reservoir every 3 days according to  instructions. Longer duration between pump site changes can result in high blood glucose.     Please bolus insulin 15 minutes before you eat meals to avoid spikes and lows in blood glucose.     Please bolus with every snack outside of daily meals.     If you are having sensor issues, please check glucose by finger sticks and input the glucose levels into the pump.     Contact the supplier if you are having sensors or pump issues for troubleshooting and additional supplies.              Pump Back Up Plan:   1.  In case you are not certain the pump or tubing is working correctly, use this plan.  2.  Stop the pump and disconnect the tubing and insertion set.  3.  If you will be off the pump for more than 1-2 hours without a basal rate, you must correct with Novolog or switch to a long acting insulin plan.    4.  If the insulin pump is non functional and discontinued for anticipated more than 20 hours, please give daily injections of:  Long acting insulin - Lantus 22 units daily.   Short acting insulin for meals - 1 unit for every 13 grams of carbs.   5.  When the insulin pump is restarted, do not restart basal rates until at least 22 hours after the last long acting insulin injection. You can set a 0% temporary basal setting that will last until this time and use your pump to bolus for meals and correction.  6.  If glucose continues to rise, or no fresh backup insulin is available, or unable to perform the above instructions. Please be evaluated in the  "ED.        For any technical insulin pump issues, Contact the insulin pump company representative to troubleshoot the problems. (Help numbers are on the back of the pump device)               Hypoglycemia Treatment - The "15-15 Rule".     If you experience an episode of hypoglycemia (glucose less than 70), follow the 15-15 rule.     Have 15 grams of carbohydrate to raise your blood sugar and recheck it after 15 minutes. If its still below 70 mg/dL, have another serving.     Repeat these steps until your blood sugar is at least 70 mg/dL. Once your blood sugar is back to normal, eat a meal or snack to make sure it doesnt lower again.     This may be:     4 ounces (1/2 cup) of juice or regular soda (not diet)  1 tablespoon of sugar, honey, or corn syrup  Hard candies, jellybeans, or gumdrops--see food label for how many to consume  Make a note about any episodes of low blood sugar and talk with your health care team about why it happened. They can suggest ways to avoid low blood sugar in the future.      Many people tend to want to eat as much as they can until they feel better. This can cause blood sugar levels to shoot way up. Using the step-wise approach of the "15-15 Rule" can help you avoid this, preventing high blood sugar levels.     Note:  When treating a low, the choice of carbohydrate source is important. Complex carbohydrates, or foods that contain fats along with carbs (like chocolate) can slow the absorption of glucose and should not be used to treat an emergency low.     For more information, visit:  [email or web address]            Please notify your physician or me if  you have persistent low blood glucose <70 or persistent elevated glucose >250 as soon as possible in addition to the steps suggested above.    " Alert and oriented to person, place and time

## 2023-12-12 NOTE — PROGRESS NOTES
Pre-operative Diagnosis:  1.  Right gastrocnemius equinus 2.  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  Right 2nd toe deformity 3.  Right great toe soft tissue mass 2 cm in diameter     Post-operative Diagnosis: Same    Procedures Performed:  1.  Right endoscopic gastrocnemius recession 2.  Right 1st metatarsophalangeal joint fusion 2.  Right 2nd toe partial phalangectomy 3.  Right great toe soft tissue mass excision 2 cm diameter    Surgeon: Kameron Tran DPM    Assistants: LOU Jones, Advanced Practice Nurse Prescriber, performed essential components of exposure and retraction of vital structures, bone preparation and maintenance of bony alignment for hardware insertion, irrigation of wounds, deep and superficial closure and assist with a portion of post operative care. Assistance was beyond standard tech training and capabilities.    Anesthesia: CRNA: Bonifacio Ivory CRNA  Anesthesiologist: Trever Fairbanks MD General     EBL: Minimal     Unusual Procedural Service:   None    Complications: None    Specimens: None     Indications: The patient presented to the office complaining of pain in the affected area.  Patient's condition has been unresponsive or not amenable to conservative care to this point and therefore surgical options were offered at this time along all potential risks, benefits, as well as treatment alternatives. I explained all this fully to the patient's level of understanding. No guarantees are given. Of note clinical and radiographic findings to correlate well with the above diagnosis.    The patient was brought to the operating room room via gurney. Patient was placed on the operating table in the supine position. Patient's foot was prepped, scrubed and draped in the usual aseptic manner. Tourniquet was inflated at this time.    Attention was directed to the medial aspect of the calf. Approximately 3 cm distal to the myotendinous junction of the  68 y.o. female here for add on visit today -   Type 1 -   Has been having issues with alarm on her insulin pump -   Her glucose began running high yesterday -     Her A1c is typically pretty well controlled - last was @ 7.8% 2months ago on bloodwork.   She is very nervous as her glucose was high yesterday -   She noticed her dexcom was not connecting to her pump -   She tried changing her insulin pods out on multiple occasion - but still no dexcom connection.   We ensured she has the right transmitter # entered into her pdm. It is correct/I verified it myself.   We changed out her dexcom g6 today - she had a new sensor with her.   See snapshots of pump info and cgm info from December 11th, yesterday and December 12th - today.   Her glucose on fingerstick is correlating with her dexcom - level of 212.   She's been scared to eat for fear her glucose is going to go up -   Today I did education with her on automated mode vs. Manual mode.   If any question or doubt/she can go into manual mode -   Or use back up insulin pens.   She has her Lantus pens here with her in the room.   Denies any low glucose readings.     Glooko report - see scanned in .   Total daily dose is 36.2 units/day -   99% in automated mode.   4% in automated limited mode (occurred yesterday) -   1% manual mode.   Carbs per day - input of 119 grams.   Settings -   Active insulin time 3 hours -   Basal rate is 0.65 units/hour -   Carb ratio is 1: 13 -   Isf is set at 5   Bg correction for 120     Dexcom g6 - downloaded and reviewed today -   Average glucose is 184 -   A1c estimated at 7.7% -   52% time in range.   32% highs.   15% very high.   1% lows.   <1% extreme lows.                         Last visit notes from November 2023 -   68 y.o. female here for routine follow up visit for management of T1dm -   Last seen September 2023 - those notes are below.     A1c is @ 7.8% - done September 2023 -   She continues on omnipod 5 with dexcom  g6 -   She enjoys using her pump - was switched to pump from another endocrinologist earlier this year.     Americo report -   Total daily dose is 37 units/day -  56% is coming from basal rate.   44% is coming from bolusing -   Giving around 7 boluses per day -   She admits she will give free boluses (not using calculator) to get her blood sugar   Putting in around 78.8 grams of carbs per day -   Active insulin time is 3 hours -   Basal rate is at 0.65 units/hour -   ISF is at 55 -   Bg correction is 120   Carb ratio is 1: 15   Bg target is 120     Dexcom g6 - downloaded and reviewed today -   Average glucose is 170   Gmi - A1c is estimated at 7.4% -   64% time in range.   <1% low   <1% very lows.   25% highs.   10% very highs.     Notices her glucose is more controlled when she is busy -   She does report lows - has baqsimi in case of an emergency and has used it before.   Was sick a few weeks ago and had to use steroid shots.       HPI: Catalina Mcgarry is a 68 y.o.  female c/I for visit to address Diabetes Type 1  This is the first time I am seeing her for care, follows with Pamela King, NP for primary care needs.   Has seen endocrinologist in the past - Dr. Echeverria in the past but had trouble with follow up. Only seen 1 time.   Was trained with diabetes educator on new insulin pump.      was diagnosed with T1DM during pregnancy in 1978 -   Began on insulin injections at that time, her mother had T2dm -   Her son has type 1 diabetes and is on injections- now pump therapy.   Has never been hospitalized r/t DM. Has never had DKA in the past.   Denies missing doses of DM medication.      On omnipod 5 - began/trained with Omnipod educator - about 1 month ago now.   Omnipod 5 - pods come from [employer].   Using novolog U100   Is in automated mode -   Bassal rate is at 0. 65 units/hour -   Target glucose is 120 -   Correct above 120 -   Insulin to carb ratio 1: 15 -   ICF - 1: 55   Duration of  gastrocnemius muscle belly and the aponeurosis a 1 cm stab incision was made medially along the calf. Blunt dissection was carried down to the level of the gastrocnemius aponeurosis utilizing a curved hemostat. A fascial plane was then elevated posterior to the gastroc aponeurosis utilizing a fascial elevator. Once this was performed, an obturator and cannula was inserted into this portal. The cannula was cleared out with several cotton-tipped swabs.  A 4 mm arthroscope was introduced in the aponeurosis was clearly identified. The ankle was placed through range of motion to confirm the aponeurosis was there. Confirmation of the location of the sural nerve was made, and the nerve was avoided. Once confirmation had been achieved, the blade was inserted adjacent to the scope. The blade was advanced from lateral to medial with the foot held and forced dorsal flexion. An immediate increase in ankle dorsiflexion is noted. Care was taken to ensure the release of the medial and lateral extent of the aponeurosis. After that the central raphe was identified and was released as well. Final inspection was given arthroscopically in the aponeurosis and then released with healthy muscle belly deep to this area. An obvious increase of ankle dorsiflexion was noted as well. Arthroscopic equipment was removed and the area was flushed.    Attention was now directed to the first metatarsal phalangeal joint. A 4 cm linear incision was made directly parallel and medial to the extensor hallucis longus tendon. Dissection was carried deep through subcutaneous tissue with care taken to identify and retract all vital and neurovascular structures. All bleeders were cauterized and ligated as necessary. Next, a meticulous dissection the incision was carried down to the level of first metatarsal phalangeal joint capsule. The capsule was opened in a linear fashion and all capsular and periosteal tissues were dissected free of their osseous  action - 3 hours.      Glooko report - we could not program her pdm    Has to re-register it - so was unable to download a Glooko report.      Dexcom G6 - gets supplies from [facility].   Shows gmi is at 7.1% -   Average bg of 157   73% time in range.   1% lows.   0% very low  25% highs.   1% very highs.        Labs reviewed in past - cpeptide >0.01 in  -   Haven't repeated since .      Complications from diabetes and pertinent co-morbidities include:   Negative for DKA.   Has BEEN ON INSULIN FOR 45 + YEARS SINCE DIAGNOSIS.   -negative for diabetic neuropathy.   -negative for nephropathy.   No microalbuminuria.   Eyes:  + Diabetic retinopathy   CV: Denies history of MI nor stroke.   CAD: Denies.  Takes aspirin 81mg tablet daily  BP: has history of HTN  Statin: Not taking  ACE/ARB: Not taking     Social History           Tobacco Use   Smoking Status Former    Current packs/day: 0.00    Types: Cigarettes    Quit date: 2014    Years since quittin.1   Smokeless Tobacco Not on file      Past medical History:        Past Medical History:   Diagnosis Date    Diabetes mellitus      Diabetes mellitus, type 2      Headache(784.0)        Family hx:         Family History   Problem Relation Age of Onset    Diabetes Maternal Aunt      Diabetes Son      Breast cancer Maternal Grandmother      Migraines Neg Hx        Current meds:   Current Outpatient Medications:     acetaminophen (TYLENOL) 500 MG tablet, Take 2 tablets (1,000 mg total) by mouth every 8 (eight) hours as needed for Pain., Disp: 30 tablet, Rfl: 0    ascorbic acid (VITAMIN C) 500 MG tablet, Take 500 mg by mouth once daily., Disp: , Rfl:     blood-glucose sensor (DEXCOM G6 SENSOR) Eileen, 1 each by Misc.(Non-Drug; Combo Route) route every 10 days. Apply/change sensor to skin every 10 days., Disp: 10 each, Rfl: 3    blood-glucose transmitter (DEXCOM G6 TRANSMITTER) Eileen, 1 each by Misc.(Non-Drug; Combo Route) route Daily. Use transmitter in sensor with  attachments and reflected medially and laterally thus exposing the first metatarsal phalangeal joint. The joint was noted to be grossly damaged, and the position was also noted to be abnormal. Any spurs that were found were removed. The area was now prepped for fusion by removing all articular surface and preparing down past subchondral bone utilizing a cup and cone reamer. This is performed on both sides of the joint. The area was further prepared by fenestrate the subchondral bone utilizing the guide pin from the cup and cone reamers. The area was then flushed with copious amounts of sterile normal saline. The joint is placed into proper alignment and was pinned in place. Then utilizing standard AO principals a compression screw was applied across the first metatarsal phalangeal joint. Excellent compression was noted. A dorsal locking plate was then applied with locking and nonlocking compression screws. Excellent placement of all hardware as noted, excellent reduction of any deformity was noted.    Attention was directed to the dorsal aspect of the right 2nd toe.  Two semi elliptical incisions were made over the dorsal aspect of the distal interphalangeal joint.  Full-thickness incision was made to release the capsular tissue.  The skin wedge was removed.  Sagittal saw was used to remove the head of the middle phalanx in an oblique fashion to correct for the deformity.  Rongeur removed the excess bone.  Excellent correction noted to the valgus deformity of the right 2nd toe.  The toe sat in a rectus position.  The wound was flushed with copious amounts of normal saline.  The skin was closed with 3.0 nylon.    Attention was directed plantar aspect of the right great toe, linear incision was made overlying the large soft tissue mass.  The soft tissue mass was a proximally 2 cm in diameter.  It is soon as a incise the skin ganglion cystic type gelatinous tissue was expressed.  Proximally 2 cc.  This was sent to  G6 system. Change new transmitter every 3 months., Disp: 1 each, Rfl: 3    fluticasone propionate (FLONASE) 50 mcg/actuation nasal spray, 1 spray by Each Nostril route once daily., Disp: , Rfl:     glucagon (BAQSIMI) 3 mg/actuation Spry, 1 each by Nasal route daily as needed (IF GLUCOSE IS LESS THAN 70)., Disp: 2 each, Rfl: 1    ibuprofen (ADVIL,MOTRIN) 600 MG tablet, Take 1 tablet (600 mg total) by mouth every 8 (eight) hours as needed for Pain., Disp: 20 tablet, Rfl: 0    insulin aspart U-100 (NOVOLOG) 100 unit/mL injection, Inject 100 Units into the skin continuous. Gives up to 100 units daily via Omnipod insulin pump - do not directly inject. Only use with insulin pump, Disp: 40 mL, Rfl: 11    insulin pump cart,automated,BT (OMNIPOD 5 G6 PODS, GEN 5,) Crtg, Inject 1 each into the skin every 72 hours. Use with omnipod 5 pdm as directed., Disp: 30 each, Rfl: 3    nicotine (NICODERM CQ) 21 mg/24 hr, Place 1 patch onto the skin once daily., Disp: 14 patch, Rfl: 0    nicotine, polacrilex, (NICORETTE) 2 mg Gum, Take 1 each (2 mg total) by mouth as needed (in place of a cigarette). Max 10-20 pieces per day, Disp: 50 each, Rfl: 0      Current Diabetes medications:   Novolog U100 via omnipod 5 -      Medications Tried and Failed:   Lantus 20 units daily.   Novolog 3 units tid ac meals.   Metformin - Gi issues.      Social:   No Life changes/stressors currently:   Diet: following ADA diet for most part  Meals: 3 per day and snacks.     Exercise: yes - regularly 3 times per week.   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       Standards of care:   Eyes: .Most Recent Eye Exam Date: Not Found  Foot exam: Most Recent Foot Exam Date: Not Found   Diabetes education: None.     Vital Signs  There were no vitals taken for this visit.     Pertinent Labs:   Hgba1c         Lab Results   Component Value Date     HGBA1C 10.7 (H) 03/17/2011  pathology.  The cyst was extending from the hallux interphalangeal joint.  Rongeur was used to remove any nonviable tissue.  There was no bony involvement.  The capsular tissue was repaired with 2.0 Vicryl to decrease the chance of the foot from gaping the joint again.  The wound was flushed with copious amounts of normal saline.  The skin was closed with 3.0 nylon.    Attention was directed to the surgical site. The wound is flushed with copious amounts of normal saline. Deep and superficial fascial closure were performed at this time. Skin incision was covered with Xeroform, 4 x 4's, and soft compressive dressing. Tourniquet was deflated at this time and normal capillary refill was noted. Patient was transferred to PACU for monitoring. All vital signs and vascular status were intact. Patient is to followup in 2 weeks.        "     Lipid panel No results found for: "CHOL"  No results found for: "HDL"  No results found for: "LDLCALC"  No results found for: "TRIG"  No results found for: "CHOLHDL"   CMP        Glucose   Date Value Ref Range Status   03/31/2013 117 (H) 70 - 110 mg/dl Final            BUN   Date Value Ref Range Status   03/31/2013 11 6 - 20 mg/dl Final            Creatinine   Date Value Ref Range Status   03/31/2013 0.7 0.5 - 1.4 mg/dl Final              eGFR if    Date Value Ref Range Status   03/31/2013 >60 >60 mL/min Final       Comment:       Estimated glomerular filtration rate (eGFR) is normalized to an  average body surface area of 1.73 square meters.  The calculation  used to obtain the eGFR is the adjusted MDRD equation, which factors  patient sex, age, race, and creatinine result.  Since race is unknown  in our information system, the eGFR values for -American  and Non--American patients are given for each creatinine  result.            eGFR if non    Date Value Ref Range Status   03/31/2013 >60 >60 mL/min Final            AST   Date Value Ref Range Status   03/31/2013 11 10 - 40 U/L Final            ALT   Date Value Ref Range Status   03/31/2013 9 (L) 10 - 44 U/L Final      Microalbumin creatinine ratio:         Lab Results   Component Value Date     MICALBCREAT 6.4 02/24/2011         Review Of Systems:   Gen: Appetite good, no weight gain or loss, denies fatigue and weakness. Denies polydipsia.  Skin: Skin is intact and heals well, denies any rashes or hair changes.   EENT: Denies any acute visual disturbances, nor blurred vision.    Resp: Denies SOB or Dyspnea on exertion, denies cough.   Cardiac: Denies chest pain, palpitations, or swelling.   GI: Denies abdominal pain, nausea or vomiting, diarrhea, or constipation.   /GYN: Denies nocturia, nor burning, frequency or pain on urination.  MS/Neuro: Denies numbness/ tingling in BLE; Gait steady, speech clear  Psych: " Denies drug/ETOH abuse, no hx of depression. Increased anxiety.   Other systems: negative.     Physical Exam:   GENERAL: Well developed, well nourished in appearance.   PSYCH: AAOx3, appropriate mood and affect, pleasant expression, conversant, appears relaxed, well groomed.   EYES: PERRL, Conjunctiva and corneas clear  NECK: Soft and Supple, trachea midline,   CHEST: Even, regular, and unlabored respirations  ABDOMEN: Soft, non-tender, non-distended.   VASCULAR: pedal pulses palpable bilaterally, no edema.  NEURO:  cranial nerves II - XII intact   MUSCULOSKELETAL: Good ROM, equal strength, equal hand grasp, steady gait.   SKIN: Skin warm, dry, and intact     Assessment and Plan of Care:      Diagnoses and all orders for this visit:     Type 1 diabetes mellitus with hyperglycemia  -     Anti-islet cell antibody; Future  -     Comprehensive Metabolic Panel; Future  -     C-Peptide; Future  -     Glutamic Acid Decarboxylase; Future  -     Hemoglobin A1C; Future  -     Lipid Panel; Future  -     Microalbumin/Creatinine Ratio, Urine; Future  -     insulin aspart U-100 (NOVOLOG) 100 unit/mL injection; Inject 100 Units into the skin continuous. Gives up to 100 units daily via Omnipod insulin pump - do not directly inject. Only use with insulin pump  -     glucagon (BAQSIMI) 3 mg/actuation Spry; 1 each by Nasal route daily as needed (IF GLUCOSE IS LESS THAN 70).  -     insulin pump cart,automated,BT (OMNIPOD 5 G6 PODS, GEN 5,) Crtg; Inject 1 each into the skin every 72 hours. Use with omnipod 5 pdm as directed.  -     blood-glucose sensor (DEXCOM G6 SENSOR) Eileen; 1 each by Misc.(Non-Drug; Combo Route) route every 10 days. Apply/change sensor to skin every 10 days.  -     blood-glucose transmitter (DEXCOM G6 TRANSMITTER) Eileen; 1 each by Misc.(Non-Drug; Combo Route) route Daily. Use transmitter in sensor with G6 system. Change new transmitter every 3 months.     Proliferative diabetic retinopathy of both eyes associated with  type 1 diabetes mellitus, unspecified proliferative retinopathy type     Diabetes mellitus type 1, with complication, on long term insulin pump           1. T1DM with hyperglycemia- Hgba1c goal is 7.5% or less without hypoglycemia - 10.7% ---> 7.8%  no changes today - did some troubleshooting - put her into manual mode.   DE consulted -   Changed her dexcom g6 - will wait 2 hours before it will connect.   Advised if it doesn't connect, then just stay in manual mode, and enter in your glucose/utilize your dexcom glucose readings still - until you can get back into automated mode. (She went into automated limited mode after cgm would not connect further).   Keep target bg at 120 -   Basal rate at 0.65 units/hour.   Carb ratio 1: 15 -- decreased it to 1: 13 at last visit.   ISF is at 1: 55   Basal rate no changes.   Continue to bolus before meals. Enter in carbs and use bolus calculator -0   Sent vials and pods for refill to local [facility] pharmacy.   Dexcom g6 - supplies to [facility]. send in new rx.   discussed DM, progression of disease, long term complications, CV risk factors and tx options.   Advise compliance with ADA diet and encourage exercise  Reviewed  hypoglycemia, s/s and appropriate tx. Have/get quick acting glucose tablets at hand.   Instructed to monitor Blood glucose 2 - 4x/day and bring meter/ log to every clinic visit.   Gets injections in her eyes - Dr. Herrera.      2. HTN- controlled, continue meds as previously prescribed and monitor.   Needs urine mac lab done.      3. Lipids- LDL goal < 100. Need lipid panel - obtain.      4. Weight - BMI There is no height or weight on file to calculate BMI.   Encourage Ada diet and exercise.      5. Renal Function - stable - no issues.          Continue on cgm therapy.   Continue on pump therapy.   Ensured back up insulin on hand.   DE consulted for further education on troubleshooting - manual mode/automated mode.   Follow up in 1 - 2 months as prior  planned with labs.                Deleted by: Abby Conklin NP at 9/19/2023  4:15 PM

## 2023-12-12 NOTE — TELEPHONE ENCOUNTER
Patient was seen in the office today. States that her Dexcom was changed out in the office today but it is still not working. States that there are no BS readings and she has not received any insulin. Advised to speak with her provider and office staff. Best contact number is [phone]. Advised the patient to call back with any additional questions or concerns. Patient verbalizes understanding.     Reason for Disposition   Caller has URGENT medicine question about med that PCP or specialist prescribed and triager unable to answer question    Protocols used: Medication Question Call-A-OH

## 2023-12-12 NOTE — TELEPHONE ENCOUNTER
"Re-Sent rx for strips and lancets - specified brand "true metrix" on the rx for her.   as back up use in case of cgm failure.   ThanksAbby   "

## 2023-12-12 NOTE — TELEPHONE ENCOUNTER
"Hey, so call patient.   If the dexcom is working and giving her glucose readings - that is great. That's how she should monitor her glucose levels.   If it's not, use a fingerstick to monitor blood sugar levels.     The pump is working in "manual mode" if it's not in automated mode.   Make sure to tell  her to put the pump in "manual mode".   This way she will receive insulin at a steady basal rate, then she just needs to use the purple vial at bottom screen/on her pdm - just like normal, and give herself meal time insulin whenever she eats... just like normal.   The only thing that has changed is that the glucose from the dexcom is not populating in.     I don't know why, but she needs more troubleshooting.     For now, I recommend that she stays on her insulin pump - swap into manual mode - she will be getting her insulin - jsut not getting "correction boluses".   Still bolus before meals.   Let's set her up with a diabetes educator if available tomorrow -     She does have another PDM with her - I think [employer] overnight shipped it - so maybe needs pdm reprogrammed/reset up - if this is one is faulty.. that's the only thing I can think of right off...     Diabetes educator will be the best resource here.     Also, if in doubt, you can always use back up insulin -   Pens - just like in the past.   For now, again  best to stay on insulin pump - just in manual mode.    Putting in consult now.   I am sorry it didn't connect! So odd...     Thanks,  Abby   "

## 2023-12-13 ENCOUNTER — TELEPHONE (OUTPATIENT)
Dept: INTERNAL MEDICINE | Facility: CLINIC | Age: 68
End: 2023-12-13
Payer: MEDICARE

## 2023-12-13 NOTE — TELEPHONE ENCOUNTER
Called Pt.   Advised her that it would be beneficial to see Diabetes Educator soon.    Pt states the appt would have to be 2x  weeks from today.

## 2023-12-13 NOTE — TELEPHONE ENCOUNTER
Spoke with the pt, she states the pump is not off of her, and nothing is working, she is on phone with Omnipod and dexcom back and forth.

## 2023-12-13 NOTE — TELEPHONE ENCOUNTER
Patient needs to be seen to review/utilize omnipod -   She was trained by omnipod clotilde at another clinic ([facility] I think) but came to me for following...   She's been doing great until she entered automated limited mode -   Saw her yesterday, her g6 just wasn't connecting.   We tried changing out sensor, but apparently still not connecting.   I told her yesterday if not connecting still, go into manual mode and at least use the pump and dexcom independently.     She called today and still having issues.   I think best to sit down and eval what's going on.   Omnipod shipped her a new pdm, so you may just need to reprogram new pdm, wondering if pdm issues/malfunction.   We are calling her to re-iterate use of back up insulin pens in the case of pump failure. She has pens at home of lantus daily.     Consult is placed. Hope you can see her soon....     Thanks!   -Abby

## 2023-12-13 NOTE — TELEPHONE ENCOUNTER
I checked back in on this patient... looks like she was in manual mode rom 12 noon until 5/6pm. Then took the pump off.   It was giving her insulin.   She was putting in boluses - but was entering carbs, not entering a glucose level, therefore was not getting the insulin she deserved/needed.     Looks like her glucose got to 300's then came back down to 150's.     She definitelly needs to meet with DE - I put in a consult for within 1 days, so earlierst appt/availability so she can get back on her regimen in automated mode.

## 2023-12-14 ENCOUNTER — PATIENT MESSAGE (OUTPATIENT)
Dept: DIABETES | Facility: CLINIC | Age: 68
End: 2023-12-14
Payer: MEDICARE

## 2023-12-14 ENCOUNTER — TELEPHONE (OUTPATIENT)
Dept: DIABETES | Facility: CLINIC | Age: 68
End: 2023-12-14
Payer: MEDICARE

## 2023-12-21 ENCOUNTER — TELEPHONE (OUTPATIENT)
Dept: INTERNAL MEDICINE | Facility: CLINIC | Age: 68
End: 2023-12-21
Payer: MEDICARE

## 2023-12-21 NOTE — TELEPHONE ENCOUNTER
----- Message from Rick Spears MA sent at 12/21/2023 10:11 AM CST -----  Regarding: omnipod  Contact: Pt  [phone]    ----- Message -----  From: Mitzi Santillan  Sent: 12/21/2023   8:39 AM CST  To: [facility] STAR[facility] Staff    Patient is requesting a call back concerning her new Omni Pod unit/supplies had come in and needs your guidance.    Please call and advise.    Thank You    ##Please do NOT reply to sender as this is from the call center and they answer incoming calls only.

## 2024-01-04 ENCOUNTER — CLINICAL SUPPORT (OUTPATIENT)
Dept: DIABETES | Facility: CLINIC | Age: 69
End: 2024-01-04
Payer: MEDICARE

## 2024-01-04 ENCOUNTER — TELEPHONE (OUTPATIENT)
Dept: INTERNAL MEDICINE | Facility: CLINIC | Age: 69
End: 2024-01-04
Payer: MEDICARE

## 2024-01-04 DIAGNOSIS — E10.65 TYPE 1 DIABETES MELLITUS WITH HYPERGLYCEMIA: ICD-10-CM

## 2024-01-04 DIAGNOSIS — E10.8 DIABETES MELLITUS TYPE 1, WITH COMPLICATION, ON LONG TERM INSULIN PUMP: Primary | ICD-10-CM

## 2024-01-04 DIAGNOSIS — Z96.41 DIABETES MELLITUS TYPE 1, WITH COMPLICATION, ON LONG TERM INSULIN PUMP: Primary | ICD-10-CM

## 2024-01-04 PROCEDURE — 95251 CONT GLUC MNTR ANALYSIS I&R: CPT | Mod: S$GLB,,, | Performed by: NURSE PRACTITIONER

## 2024-01-04 PROCEDURE — G0108 DIAB MANAGE TRN  PER INDIV: HCPCS | Mod: S$GLB,,, | Performed by: DIETITIAN, REGISTERED

## 2024-01-04 PROCEDURE — 99999 PR PBB SHADOW E&M-EST. PATIENT-LVL I: CPT | Mod: PBBFAC,,, | Performed by: DIETITIAN, REGISTERED

## 2024-01-04 NOTE — PROGRESS NOTES
Diabetes Care Specialist Progress Note  Author: Sujatha Gutierrez RD, CDE  Date: 1/4/2024    Program Intake  Reason for Diabetes Program Visit:: Intervention  Type of Intervention:: Individual  Individual: Education  Education: Pattern Management    Lab Results   Component Value Date    HGBA1C 7.8 (H) 09/20/2023     Patient came in to have her new Omnipod PDM backup set up, but it was not charged so she will plan to come back for that. Based on [place]/Dexcom reports - TIR 42%, High 34%, Very High 24%, 0% Lows. I made some slight adjustments today - Target 120 to 110, I:CHO ratio 13 to 11, and ISF 55 to 50. Provider notified and reports sent for further review. Patient feels like she is not getting enough insulin and based on reports, it looks like she could use an adjustment. She is CHO counting and she feels she is pretty accurate with it. She makes sure to switch back to automated mode if she gets kicked out. Trying to bolus 15 min before meals if she feels she is able to. She will call me when her new PDM is charged to come in to get the settings imported.    Assessment Summary and Plan    Based on today's diabetes care assessment, the following areas of need were identified:          10/3/2023    12:01 AM   Social   Support No   Access to Mass Media/Tech No   Cognitive/Behavioral Health No   Culture/[place] No   Communication No   Health Literacy No            10/3/2023    12:01 AM   Clinical   Medication Adherence No   Nutritional Status No            10/3/2023    12:01 AM   Diabetes Self-Management Skills   Diabetes Disease Process/Treatment Options No   Nutrition/Healthy Eating No   Physical Activity/Exercise No   Medication No   Home Blood Glucose Monitoring No   Acute Complications No   Chronic Complications No   Psychosocial/Coping No          Today's interventions were provided through individual discussion, instruction, and written materials were provided.      Patient verbalized understanding of instruction  and written materials.  Pt was able to return back demonstration of instructions today. Patient understood key points, needs reinforcement and further instruction.     Diabetes Self-Management Care Plan:    Today's Diabetes Self-Management Care Plan was developed with Catalina's input. Catalina has agreed to work toward the following goal(s) to improve his/her overall diabetes control.      There are no recently modified care plans to display for this patient.      Follow Up Plan     Follow up in about 8 weeks (around 2/28/2024).    Today's care plan and follow up schedule was discussed with patient.  Catalina verbalized understanding of the care plan, goals, and agrees to follow up plan.        The patient was encouraged to communicate with his/her health care provider/physician and care team regarding his/her condition(s) and treatment.  I provided the patient with my contact information today and encouraged to contact me via phone or [facility] Patient Portal as needed.     Length of Visit   Total Time: 60 Minutes

## 2024-01-04 NOTE — TELEPHONE ENCOUNTER
----- Message from Rick Spears MA sent at 12/21/2023  1:59 PM CST -----  Regarding: DE appt on 1/4/2024

## 2024-01-04 NOTE — TELEPHONE ENCOUNTER
Called Pt.   Appt Reminder.   With Sujatha Gutierrez, Diabetes Educator.  1/4/2024 @230p.    Pt was understanding and Compliant.

## 2024-01-10 DIAGNOSIS — E10.65 TYPE 1 DIABETES MELLITUS WITH HYPERGLYCEMIA: Primary | ICD-10-CM

## 2024-01-10 RX ORDER — INSULIN PMP CART,AUT,G6/7,CNTR
1 EACH SUBCUTANEOUS
Qty: 30 EACH | Refills: 3 | Status: SHIPPED | OUTPATIENT
Start: 2024-01-10 | End: 2024-01-16 | Stop reason: SDUPTHER

## 2024-01-10 NOTE — TELEPHONE ENCOUNTER
----- Message from Kelly Diaz sent at 1/10/2024  1:05 PM CST -----  Requesting an RX refill or new RX.  Is this a refill or new RX: new  RX name and strength (copy/paste from chart):  insulin pump cart,automated,BT (OMNIPOD 5 G6 PODS, GEN 5,) Crtg   Is this a 30 day or 90 day RX:   Pharmacy name and phone # (copy/paste from chart):    [id number] - [address] [address]  Phone: [phone] Fax: [phone]    The doctors have asked that we provide their patients with the following 2 reminders -- prescription refills can take up to 72 hours, and a friendly reminder that in the future you can use your [facility] account to request refills: yes

## 2024-01-16 DIAGNOSIS — E10.65 TYPE 1 DIABETES MELLITUS WITH HYPERGLYCEMIA: Primary | ICD-10-CM

## 2024-01-16 RX ORDER — INSULIN PMP CART,AUT,G6/7,CNTR
1 EACH SUBCUTANEOUS
Qty: 30 EACH | Refills: 3 | Status: SHIPPED | OUTPATIENT
Start: 2024-01-16

## 2024-01-16 NOTE — TELEPHONE ENCOUNTER
Tried calling pharmacy this morning to clarify/explain to them the difference between what was prescribed and what was in the message  - no answer. Likely just lack of staffing today due to the cold weather. Got transferred around for about 15 minutes/and put on hold. I didn't get to talk to anyone inside the pharmacy yet -     So from the message:   sounds like they were trying to give her 3 starter kits not 30 day supply of pods. I definitely prescribed the pods - not the starter kits - for a 90 day supply on 1/10/24 - they (pharmacy at this particular [facility] maybe don't understand the difference between a Kit - which is a PDM and 2 boxes of pods (10 total) - and then just rx for pods - 5 per box.     I resent it again today -   Its pods only - no starter kit was prescribed, so I think it is just a misunderstanding again on the pharmacy's end. Can you try calling them again within the hour/this morning if you are able?     If you can let me know if issues still, if they don't have in stock... I can send this rx to [facility] so Mrs. Arnold can get what she needs - hate for her to run out of pods.     Thanks,  Abby

## 2024-01-16 NOTE — TELEPHONE ENCOUNTER
----- Message from Daisha Owusu sent at 1/13/2024 10:15 AM CST -----  Contact: [phone]  Per pt, she is requesting a refill on her PODS. Per pt, [facility] was trying to give her 3 kits, she doesn't need that.  Pt states she just needs the PODS.    Pt is using   [id number] - [address] AT [address] [address]  Phone: [phone] Fax: [phone]                Thank you

## 2024-01-22 NOTE — PROGRESS NOTES
"Patient met with educator for follow up - she reported not getting enough insulin, carb coutning with meals -   Puts in around 77 grams/day on average.   Is trying harder to stay in automated mode -     [place] report reviewed -   TDD is 30.6 units/day -   71% coming from basal rate.   29% coming from bolusing.   92% in automated mode.   4% limited automated mode.   8% manual mode.   Giving 4.9 boluses per day -     Got new PDM in mail - set it up by DE - but it wasn't "fully charged" -   adjustments made based on omnipod download by the educator, and agree    - Target 120 changed to target of 110,   I:CHO ratio 13 changed to 11  ISF changed from 55 to 50.     She continues on dexcom - reviewed report -   Average bg is 203 -   42% time in range.   0% lows.   33% hgihs.   25% very highs.     Patient has follow up visit with me in 6 weeks so will re-eval changes made then and see if impactful/helping.   ADIN Martinez       "

## 2024-02-01 ENCOUNTER — CLINICAL SUPPORT (OUTPATIENT)
Dept: DIABETES | Facility: CLINIC | Age: 69
End: 2024-02-01
Payer: MEDICARE

## 2024-02-01 DIAGNOSIS — E10.65 TYPE 1 DIABETES MELLITUS WITH HYPERGLYCEMIA: Primary | ICD-10-CM

## 2024-02-01 PROCEDURE — G0108 DIAB MANAGE TRN  PER INDIV: HCPCS | Mod: S$GLB,,, | Performed by: DIETITIAN, REGISTERED

## 2024-02-01 NOTE — PROGRESS NOTES
Diabetes Care Specialist Progress Note  Author: Sujatha Gutierrez RD, CDE  Date: 2/1/2024    Program Intake  Reason for Diabetes Program Visit:: Intervention  Type of Intervention:: Individual  Education: Other (Patient needs assistance with getting Omnipod settings programmed into new controller)    Lab Results   Component Value Date    HGBA1C 7.8 (H) 09/20/2023     Patient recently received a new Omnipod 5 controller and needs assistance with getting settings programmed into new controller. This was completed today:   Basal 0.65  I:CHO 10  ISF 50  Target 110  Correct 110  IOB 3    Dexcom UN: [email or web address], PW: [email or web address]  [employer] UN; [email or web address], PW: [email or web address]  [employer]: UN: [email or web address]; PW: [email or web address]    No further questions or concerns today      Assessment Summary and Plan    Based on today's diabetes care assessment, the following areas of need were identified:          10/3/2023    12:01 AM   Social   Support No   Access to Mass Media/Tech No   Cognitive/Behavioral Health No   Culture/[place] No   Communication No   Health Literacy No            10/3/2023    12:01 AM   Clinical   Medication Adherence No   Nutritional Status No            10/3/2023    12:01 AM   Diabetes Self-Management Skills   Diabetes Disease Process/Treatment Options No   Nutrition/Healthy Eating No   Physical Activity/Exercise No   Medication No   Home Blood Glucose Monitoring No   Acute Complications No   Chronic Complications No   Psychosocial/Coping No          Today's interventions were provided through individual discussion, instruction, and written materials were provided.      Patient verbalized understanding of instruction and written materials.  Pt was able to return back demonstration of instructions today. Patient understood key points, needs reinforcement and further instruction.     Diabetes Self-Management Care Plan:    Today's Diabetes Self-Management Care Plan was developed with [place]  input. Catalina has agreed to work toward the following goal(s) to improve his/her overall diabetes control.      There are no recently modified care plans to display for this patient.      Follow Up Plan     Follow up in about 27 days (around 2/28/2024) for Provider Visit.    Today's care plan and follow up schedule was discussed with patient.  Catalina verbalized understanding of the care plan, goals, and agrees to follow up plan.        The patient was encouraged to communicate with his/her health care provider/physician and care team regarding his/her condition(s) and treatment.  I provided the patient with my contact information today and encouraged to contact me via phone or [facility] Patient Portal as needed.     Length of Visit   Total Time: 30 Minutes

## 2024-02-07 DIAGNOSIS — E10.65 TYPE 1 DIABETES MELLITUS WITH HYPERGLYCEMIA: ICD-10-CM

## 2024-02-07 RX ORDER — GLUCAGON 3 MG/1
1 POWDER NASAL DAILY PRN
Qty: 2 EACH | Refills: 1 | Status: SHIPPED | OUTPATIENT
Start: 2024-02-07 | End: 2024-02-28 | Stop reason: SDUPTHER

## 2024-02-15 ENCOUNTER — PATIENT OUTREACH (OUTPATIENT)
Dept: ADMINISTRATIVE | Facility: HOSPITAL | Age: 69
End: 2024-02-15
Payer: MEDICARE

## 2024-02-15 NOTE — PROGRESS NOTES

## 2024-02-28 ENCOUNTER — OFFICE VISIT (OUTPATIENT)
Dept: INTERNAL MEDICINE | Facility: CLINIC | Age: 69
End: 2024-02-28
Payer: MEDICARE

## 2024-02-28 VITALS
DIASTOLIC BLOOD PRESSURE: 62 MMHG | BODY MASS INDEX: 24.61 KG/M2 | HEART RATE: 76 BPM | TEMPERATURE: 98 F | HEIGHT: 63 IN | OXYGEN SATURATION: 98 % | SYSTOLIC BLOOD PRESSURE: 124 MMHG | WEIGHT: 138.88 LBS | RESPIRATION RATE: 16 BRPM

## 2024-02-28 DIAGNOSIS — E10.65 TYPE 1 DIABETES MELLITUS WITH HYPERGLYCEMIA: Primary | ICD-10-CM

## 2024-02-28 DIAGNOSIS — J44.9 CHRONIC OBSTRUCTIVE PULMONARY DISEASE, UNSPECIFIED COPD TYPE: ICD-10-CM

## 2024-02-28 DIAGNOSIS — Z96.41 DIABETES MELLITUS TYPE 1, WITH COMPLICATION, ON LONG TERM INSULIN PUMP: ICD-10-CM

## 2024-02-28 DIAGNOSIS — E10.3593 PROLIFERATIVE DIABETIC RETINOPATHY OF BOTH EYES ASSOCIATED WITH TYPE 1 DIABETES MELLITUS, UNSPECIFIED PROLIFERATIVE RETINOPATHY TYPE: ICD-10-CM

## 2024-02-28 DIAGNOSIS — F17.200 TOBACCO DEPENDENCY: ICD-10-CM

## 2024-02-28 DIAGNOSIS — E10.8 DIABETES MELLITUS TYPE 1, WITH COMPLICATION, ON LONG TERM INSULIN PUMP: ICD-10-CM

## 2024-02-28 DIAGNOSIS — G63 POLYNEUROPATHY IN DISEASES CLASSIFIED ELSEWHERE: ICD-10-CM

## 2024-02-28 PROCEDURE — 3074F SYST BP LT 130 MM HG: CPT | Mod: CPTII,S$GLB,, | Performed by: NURSE PRACTITIONER

## 2024-02-28 PROCEDURE — 99215 OFFICE O/P EST HI 40 MIN: CPT | Mod: S$GLB,,, | Performed by: NURSE PRACTITIONER

## 2024-02-28 PROCEDURE — 3078F DIAST BP <80 MM HG: CPT | Mod: CPTII,S$GLB,, | Performed by: NURSE PRACTITIONER

## 2024-02-28 PROCEDURE — 1159F MED LIST DOCD IN RCRD: CPT | Mod: CPTII,S$GLB,, | Performed by: NURSE PRACTITIONER

## 2024-02-28 PROCEDURE — 95251 CONT GLUC MNTR ANALYSIS I&R: CPT | Mod: S$GLB,,, | Performed by: NURSE PRACTITIONER

## 2024-02-28 PROCEDURE — 99999 PR PBB SHADOW E&M-EST. PATIENT-LVL IV: CPT | Mod: PBBFAC,,, | Performed by: NURSE PRACTITIONER

## 2024-02-28 PROCEDURE — 3008F BODY MASS INDEX DOCD: CPT | Mod: CPTII,S$GLB,, | Performed by: NURSE PRACTITIONER

## 2024-02-28 PROCEDURE — 1126F AMNT PAIN NOTED NONE PRSNT: CPT | Mod: CPTII,S$GLB,, | Performed by: NURSE PRACTITIONER

## 2024-02-28 RX ORDER — GLUCAGON 3 MG/1
1 POWDER NASAL DAILY PRN
Qty: 2 EACH | Refills: 3 | Status: SHIPPED | OUTPATIENT
Start: 2024-02-28

## 2024-02-28 NOTE — PROGRESS NOTES
Mrs. Mcgarry is T1dm - 3 month follow up -   Following also closely with Diabetes educator -     A1c is @ 7.8% as of September 2023 -   No new lab done as of today.   Last cmp done 2/18/24 - she URI and reports was treated for strep throat.   Kidney function is stable.     Continues on omnipod 5 with dexcom g6 -   Pods from the pharmacy and insulin - U100 novolog in her pod.   She has back up insulin on hand - Lantus 22 units daily in case of an emergency.     Total daily dose is 31.3 units/day -   76% is coming from the basal rate.   24 is coming from bolusing.   2 boluses per day on average.   Carbs per day - 72 grams/day  100% in automated mode.   Active insulin time - 3 hours -   0.65 units/hour -   ISF 50 -   Bg correction 110 -   Insulin to carb ratio 1: 10   Bg target 110     Dexcom g6 - personal CGM - gets [facility].   Average glucose 174 -   Gmi estimated at 7.5% -   57% time in range.   <1% lows.   37% highs.   5% very highs.         Last visit notes as follows from December 2023 -   68 y.o. female here for add on visit today -   Type 1 -   Has been having issues with alarm on her insulin pump -   Her glucose began running high yesterday -     Her A1c is typically pretty well controlled - last was @ 7.8% 2months ago on bloodwork.   She is very nervous as her glucose was high yesterday -   She noticed her dexcom was not connecting to her pump -   She tried changing her insulin pods out on multiple occasion - but still no dexcom connection.   We ensured she has the right transmitter # entered into her pdm. It is correct/I verified it myself.   We changed out her dexcom g6 today - she had a new sensor with her.   See snapshots of pump info and cgm info from December 11th, yesterday and December 12th - today.   Her glucose on fingerstick is correlating with her dexcom - level of 212.   She's been scared to eat for fear her glucose is going to go up -   Today I did education with her on automated mode vs.  Manual mode.   If any question or doubt/she can go into manual mode -   Or use back up insulin pens.   She has her Lantus pens here with her in the room.   Denies any low glucose readings.     Americo report - see scanned in .   Total daily dose is 36.2 units/day -   99% in automated mode.   4% in automated limited mode (occurred yesterday) -   1% manual mode.   Carbs per day - input of 119 grams.   Settings -   Active insulin time 3 hours -   Basal rate is 0.65 units/hour -   Carb ratio is 1: 13 -   Isf is set at 5   Bg correction for 120     Dexcom g6 - downloaded and reviewed today -   Average glucose is 184 -   A1c estimated at 7.7% -   52% time in range.   32% highs.   15% very high.   1% lows.   <1% extreme lows.                         Last visit notes from November 2023 -   68 y.o. female here for routine follow up visit for management of T1dm -   Last seen September 2023 - those notes are below.     A1c is @ 7.8% - done September 2023 -   She continues on omnipod 5 with dexcom g6 -   She enjoys using her pump - was switched to pump from another endocrinologist earlier this year.     Americo report -   Total daily dose is 37 units/day -  56% is coming from basal rate.   44% is coming from bolusing -   Giving around 7 boluses per day -   She admits she will give free boluses (not using calculator) to get her blood sugar   Putting in around 78.8 grams of carbs per day -   Active insulin time is 3 hours -   Basal rate is at 0.65 units/hour -   ISF is at 55 -   Bg correction is 120   Carb ratio is 1: 15   Bg target is 120     Dexcom g6 - downloaded and reviewed today -   Average glucose is 170   Gmi - A1c is estimated at 7.4% -   64% time in range.   <1% low   <1% very lows.   25% highs.   10% very highs.     Notices her glucose is more controlled when she is busy -   She does report lows - has baqsimi in case of an emergency and has used it before.   Was sick a few weeks ago and had to use steroid  shots.       HPI: Catalina Mcgarry is a 68 y.o.  female c/I for visit to address Diabetes Type 1  This is the first time I am seeing her for care, follows with Pamela King, NP for primary care needs.   Has seen endocrinologist in the past - Dr. Echeverria in the past but had trouble with follow up. Only seen 1 time.   Was trained with diabetes educator on new insulin pump.      was diagnosed with T1DM during pregnancy in 1978 -   Began on insulin injections at that time, her mother had T2dm -   Her son has type 1 diabetes and is on injections- now pump therapy.   Has never been hospitalized r/t DM. Has never had DKA in the past.   Denies missing doses of DM medication.      On omnipod 5 - began/trained with Omnipod educator - about 1 month ago now.   Omnipod 5 - pods come from [employer].   Using novolog U100   Is in automated mode -   Bassal rate is at 0. 65 units/hour -   Target glucose is 120 -   Correct above 120 -   Insulin to carb ratio 1: 15 -   ICF - 1: 55   Duration of action - 3 hours.      Glooko report - we could not program her pdm    Has to re-register it - so was unable to download a [facility] report.      Dexcom G6 - gets supplies from [employer].   Shows gmi is at 7.1% -   Average bg of 157   73% time in range.   1% lows.   0% very low  25% highs.   1% very highs.        Labs reviewed in past - cpeptide >0.01 in 2011 -   Haven't repeated since 2011.      Complications from diabetes and pertinent co-morbidities include:   Negative for DKA.   Has BEEN ON INSULIN FOR 45 + YEARS SINCE DIAGNOSIS.   -negative for diabetic neuropathy.   -negative for nephropathy.   No microalbuminuria.   Eyes:  + Diabetic retinopathy   CV: Denies history of MI nor stroke.   CAD: Denies.  Takes aspirin 81mg tablet daily  BP: has history of HTN  Statin: Not taking  ACE/ARB: Not taking     Social History           Tobacco Use   Smoking Status Former    Current packs/day: 0.00    Types: Cigarettes    Quit date:  2014    Years since quittin.1   Smokeless Tobacco Not on file      Past medical History:        Past Medical History:   Diagnosis Date    Diabetes mellitus      Diabetes mellitus, type 2      Headache(784.0)        Family hx:         Family History   Problem Relation Age of Onset    Diabetes Maternal Aunt      Diabetes Son      Breast cancer Maternal Grandmother      Migraines Neg Hx        Current meds:   Current Outpatient Medications:     acetaminophen (TYLENOL) 500 MG tablet, Take 2 tablets (1,000 mg total) by mouth every 8 (eight) hours as needed for Pain., Disp: 30 tablet, Rfl: 0    ascorbic acid (VITAMIN C) 500 MG tablet, Take 500 mg by mouth once daily., Disp: , Rfl:     blood-glucose sensor (DEXCOM G6 SENSOR) Eileen, 1 each by Misc.(Non-Drug; Combo Route) route every 10 days. Apply/change sensor to skin every 10 days., Disp: 10 each, Rfl: 3    blood-glucose transmitter (DEXCOM G6 TRANSMITTER) Eileen, 1 each by Misc.(Non-Drug; Combo Route) route Daily. Use transmitter in sensor with G6 system. Change new transmitter every 3 months., Disp: 1 each, Rfl: 3    fluticasone propionate (FLONASE) 50 mcg/actuation nasal spray, 1 spray by Each Nostril route once daily., Disp: , Rfl:     glucagon (BAQSIMI) 3 mg/actuation Spry, 1 each by Nasal route daily as needed (IF GLUCOSE IS LESS THAN 70)., Disp: 2 each, Rfl: 1    ibuprofen (ADVIL,MOTRIN) 600 MG tablet, Take 1 tablet (600 mg total) by mouth every 8 (eight) hours as needed for Pain., Disp: 20 tablet, Rfl: 0    insulin aspart U-100 (NOVOLOG) 100 unit/mL injection, Inject 100 Units into the skin continuous. Gives up to 100 units daily via Omnipod insulin pump - do not directly inject. Only use with insulin pump, Disp: 40 mL, Rfl: 11    insulin pump cart,automated,BT (OMNIPOD 5 G6 PODS, GEN 5,) Crtg, Inject 1 each into the skin every 72 hours. Use with omnipod 5 pdm as directed., Disp: 30 each, Rfl: 3    nicotine (NICODERM CQ) 21 mg/24 hr, Place 1 patch onto the  "skin once daily., Disp: 14 patch, Rfl: 0    nicotine, polacrilex, (NICORETTE) 2 mg Gum, Take 1 each (2 mg total) by mouth as needed (in place of a cigarette). Max 10-20 pieces per day, Disp: 50 each, Rfl: 0      Current Diabetes medications:   Novolog U100 via omnipod 5 -      Medications Tried and Failed:   Lantus 20 units daily.   Novolog 3 units tid ac meals.   Metformin - Gi issues.      Social:   No Life changes/stressors currently:   Diet: following ADA diet for most part  Meals: 3 per day and snacks.     Exercise: yes - regularly 3 times per week.   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       Standards of care:   Eyes: .Most Recent Eye Exam Date: Not Found  Foot exam: Most Recent Foot Exam Date: Not Found   Diabetes education: None.     Vital Signs  There were no vitals taken for this visit.     Pertinent Labs:   Hgba1c         Lab Results   Component Value Date     HGBA1C 10.7 (H) 03/17/2011      Lipid panel No results found for: "CHOL"  No results found for: "HDL"  No results found for: "LDLCALC"  No results found for: "TRIG"  No results found for: "CHOLHDL"   CMP        Glucose   Date Value Ref Range Status   03/31/2013 117 (H) 70 - 110 mg/dl Final            BUN   Date Value Ref Range Status   03/31/2013 11 6 - 20 mg/dl Final            Creatinine   Date Value Ref Range Status   03/31/2013 0.7 0.5 - 1.4 mg/dl Final              eGFR if    Date Value Ref Range Status   03/31/2013 >60 >60 mL/min Final       Comment:       Estimated glomerular filtration rate (eGFR) is normalized to an  average body surface area of 1.73 square meters.  The calculation  used to obtain the eGFR is the adjusted MDRD equation, which factors  patient sex, age, race, and creatinine result.  Since race is unknown  in our information system, the eGFR values for -American  and Non--American patients are " given for each creatinine  result.            eGFR if non    Date Value Ref Range Status   03/31/2013 >60 >60 mL/min Final            AST   Date Value Ref Range Status   03/31/2013 11 10 - 40 U/L Final            ALT   Date Value Ref Range Status   03/31/2013 9 (L) 10 - 44 U/L Final      Microalbumin creatinine ratio:         Lab Results   Component Value Date     CLIFFLIZABEL 6.4 02/24/2011         Review Of Systems:   Gen: Appetite good, no weight gain or loss, denies fatigue and weakness. Denies polydipsia.  Skin: Skin is intact and heals well, denies any rashes or hair changes.   EENT: Denies any acute visual disturbances, nor blurred vision.    Resp: Denies SOB or Dyspnea on exertion, denies cough.   Cardiac: Denies chest pain, palpitations, or swelling.   GI: Denies abdominal pain, nausea or vomiting, diarrhea, or constipation.   /GYN: Denies nocturia, nor burning, frequency or pain on urination.  MS/Neuro: Denies numbness/ tingling in BLE; Gait steady, speech clear  Psych: Denies drug/ETOH abuse, no hx of depression. Increased anxiety.   Other systems: negative.     Physical Exam:   GENERAL: Well developed, well nourished in appearance.   PSYCH: AAOx3, appropriate mood and affect, pleasant expression, conversant, appears relaxed, well groomed.   EYES: PERRL, Conjunctiva and corneas clear  NECK: Soft and Supple, trachea midline,   CHEST: Even, regular, and unlabored respirations  ABDOMEN: Soft, non-tender, non-distended.   VASCULAR: pedal pulses palpable bilaterally, no edema.  NEURO:  cranial nerves II - XII intact   MUSCULOSKELETAL: Good ROM, equal strength, equal hand grasp, steady gait.   SKIN: Skin warm, dry, and intact     Assessment and Plan of Care:      Diagnoses and all orders for this visit:     Type 1 diabetes mellitus with hyperglycemia  -     Anti-islet cell antibody; Future  -     Comprehensive Metabolic Panel; Future  -     C-Peptide; Future  -     Glutamic Acid Decarboxylase;  Future  -     Hemoglobin A1C; Future  -     Lipid Panel; Future  -     Microalbumin/Creatinine Ratio, Urine; Future  -     insulin aspart U-100 (NOVOLOG) 100 unit/mL injection; Inject 100 Units into the skin continuous. Gives up to 100 units daily via Omnipod insulin pump - do not directly inject. Only use with insulin pump  -     glucagon (BAQSIMI) 3 mg/actuation Spry; 1 each by Nasal route daily as needed (IF GLUCOSE IS LESS THAN 70).  -     insulin pump cart,automated,BT (OMNIPOD 5 G6 PODS, GEN 5,) Crtg; Inject 1 each into the skin every 72 hours. Use with omnipod 5 pdm as directed.  -     blood-glucose sensor (DEXCOM G6 SENSOR) Eileen; 1 each by Misc.(Non-Drug; Combo Route) route every 10 days. Apply/change sensor to skin every 10 days.  -     blood-glucose transmitter (DEXCOM G6 TRANSMITTER) Eileen; 1 each by Misc.(Non-Drug; Combo Route) route Daily. Use transmitter in sensor with G6 system. Change new transmitter every 3 months.     Proliferative diabetic retinopathy of both eyes associated with type 1 diabetes mellitus, unspecified proliferative retinopathy type     Diabetes mellitus type 1, with complication, on long term insulin pump           1. T1DM with hyperglycemia- Hgba1c goal is 7.5% or less without hypoglycemia - 10.7% ---> 7.8% continue on omnipod 5 and dexcom g6 - would plan to upgrade her in 3 months to dexcom g7-   Keep target bg at 110 -   Basal rate at 0.65 units/hour.   Carb ratio 1: 15 -- decreased it to 1: 13 at last visit.   Decreased today from 1: 10 to 1: 9   ISF is at 1: 55   Basal rate no changes.   Continue to bolus before meals. Enter in carbs and use bolus calculator -0   Sent vials and pods for refill to local [facility] pharmacy.   Dexcom g6 - supplies to [facility] medical.   discussed DM, progression of disease, long term complications, CV risk factors and tx options.   Advise compliance with ADA diet and encourage exercise  Reviewed  hypoglycemia, s/s and appropriate tx. Have/get quick  acting glucose tablets at hand.   Instructed to monitor Blood glucose 2 - 4x/day and bring meter/ log to every clinic visit.   Gets injections in her eyes - Dr. Herrera.      2. HTN- controlled, continue meds as previously prescribed and monitor.   Needs urine mac lab done.      3. Lipids- LDL goal < 100. Need lipid panel - obtain.      4. Weight - BMI There is no height or weight on file to calculate BMI.   Encourage Ada diet and exercise.      5. Renal Function - stable - no issues.          Continue on cgm therapy.   Continue on pump therapy.   Ensured back up insulin on hand.   Labs and OV in 3 months.                Deleted by: Abby Conklin NP at 9/19/2023  4:15 PM

## 2024-02-28 NOTE — PATIENT INSTRUCTIONS
Continue omnipod 5 with your dexocm g6 -   Stay in automated mode as much as you can.     Bolus before each meal based on your carb intake.   Keep your lantus pen on hand - if pump failure, you can give/use 22 units Lantus insulin daily -     For low blood sugar - remember to keep glucose tablets on hand. You can purchase these at the pharmacy check out desk/over the counter.   If blood sugar is less than 70, take/eat 2 - 3 tablets quickly to bring your sugar up.   Always keep 15 grams of Quick acting carbohydrates on hand to eat/drink if your sugar is low - examples are 1/2 cup of juice, coke, or crackers, granola bars.   Remember to eat meals frequently to prevent low blood blood sugars.    Baqsimi refilled.     Get enough sleep - 7- 8 hours/day.   Lots of water.   Lots of plant intake.   Lots of movement/exercise as you can tolerate.     Follow up with your primary care doctor if issues with immunity -   Could check vitamin D level,   Magnesium level.   Female hormones.

## 2024-04-19 ENCOUNTER — TELEPHONE (OUTPATIENT)
Dept: INTERNAL MEDICINE | Facility: CLINIC | Age: 69
End: 2024-04-19
Payer: MEDICARE

## 2024-04-19 ENCOUNTER — PATIENT MESSAGE (OUTPATIENT)
Dept: ADMINISTRATIVE | Facility: HOSPITAL | Age: 69
End: 2024-04-19
Payer: MEDICARE

## 2024-04-19 NOTE — TELEPHONE ENCOUNTER
----- Message from Donita Mijares sent at 4/19/2024  2:01 PM CDT -----  Contact: Pt [phone]  Consult    The patient wants to know when you want to see her again    Thank you

## 2024-04-19 NOTE — TELEPHONE ENCOUNTER
Informed pt that Abby is no longer with [facility] . She asked that I message her a list of providers she can see . Sent pt a my chart msg.

## 2024-04-22 DIAGNOSIS — Z12.31 VISIT FOR SCREENING MAMMOGRAM: Primary | ICD-10-CM

## 2024-05-06 ENCOUNTER — TELEPHONE (OUTPATIENT)
Dept: DIABETES | Facility: CLINIC | Age: 69
End: 2024-05-06
Payer: MEDICARE

## 2024-05-06 ENCOUNTER — TELEPHONE (OUTPATIENT)
Dept: INTERNAL MEDICINE | Facility: CLINIC | Age: 69
End: 2024-05-06
Payer: MEDICARE

## 2024-05-06 NOTE — TELEPHONE ENCOUNTER
----- Message from Julianna Chowdhury sent at 5/6/2024 11:04 AM CDT -----  Name of Who is Calling:RADHA LOMBARDO [[id number]]        What is the request in detail:Pt would like a callback in regards discussing getting est appt for dexcom and ominpod care, pt was a pt of ADIN Conklin.Please advise thank you       Can the clinic reply by [facility]:NO        What Number to Call Back if not in [facility]:.Telephone Information:  Mobile          [phone]

## 2024-05-06 NOTE — TELEPHONE ENCOUNTER
----- Message from Donita Mijares sent at 5/6/2024 12:09 PM CDT -----  Contact: Pt [phone]  Caller is requesting an earlier appointment then we can schedule.  Caller is requesting a message be sent to the provider.     When is the next available appointment with their provider:  None  Reason for the appointment:  She wants to est care with you as a diabetes management provider    Comments:  She doesn't want to go all the way to [place] and she wants to be seen sooner than September

## 2024-05-15 ENCOUNTER — HOSPITAL ENCOUNTER (OUTPATIENT)
Dept: RADIOLOGY | Facility: OTHER | Age: 69
Discharge: HOME | End: 2024-05-15
Attending: NURSE PRACTITIONER
Payer: MEDICARE

## 2024-05-15 DIAGNOSIS — Z12.31 VISIT FOR SCREENING MAMMOGRAM: ICD-10-CM

## 2024-05-15 PROCEDURE — 77067 SCR MAMMO BI INCL CAD: CPT | Mod: 26,,, | Performed by: RADIOLOGY

## 2024-05-15 PROCEDURE — 77067 SCR MAMMO BI INCL CAD: CPT | Mod: TC

## 2024-05-15 PROCEDURE — 77063 BREAST TOMOSYNTHESIS BI: CPT | Mod: 26,,, | Performed by: RADIOLOGY

## 2024-06-20 ENCOUNTER — TELEPHONE (OUTPATIENT)
Dept: INTERNAL MEDICINE | Facility: CLINIC | Age: 69
End: 2024-06-20
Payer: MEDICARE

## 2024-06-20 ENCOUNTER — TELEPHONE (OUTPATIENT)
Dept: DIABETES | Facility: CLINIC | Age: 69
End: 2024-06-20
Payer: MEDICARE

## 2024-06-20 NOTE — TELEPHONE ENCOUNTER
----- Message from Delaney Owusu sent at 6/20/2024  1:44 PM CDT -----  Contact: [phone]  1MEDICALADVICE     Patient is calling for Medical Advice regarding:Patient would like to know if she can be seen by provider for dexcom G6 and Omni pod 5? Please call and advise.     Comments: Please call and advise.     Please advise patient replies from provider may take up to 48 hours.

## 2024-06-20 NOTE — TELEPHONE ENCOUNTER
----- Message from Delaney Owusu sent at 6/20/2024  1:51 PM CDT -----  Contact: [phone]  1MEDICALADVICE     Patient is calling for Medical Advice regarding:Patient would like to know if she can be seen by provider for dexcom G6 and Omni pod 5? Please call and advise.     Comments: Please call and advise. Patient need an appointment as soon as possible.     Please advise patient replies from provider may take up to 48 hours.

## 2024-06-20 NOTE — TELEPHONE ENCOUNTER
Spoke to pt. Pt stated she scheduled appt with Alisa Emerson already and did not want to schedule appt

## 2024-07-09 ENCOUNTER — PATIENT MESSAGE (OUTPATIENT)
Dept: DIABETES | Facility: CLINIC | Age: 69
End: 2024-07-09
Payer: MEDICARE

## 2024-07-09 ENCOUNTER — TELEPHONE (OUTPATIENT)
Dept: DIABETES | Facility: CLINIC | Age: 69
End: 2024-07-09
Payer: MEDICARE

## 2024-07-09 NOTE — TELEPHONE ENCOUNTER
Called pt, no answer    Detail Level: Zone Render In Strict Bullet Format?: No Initiate Treatment: Hydrocortisone 2.5% ointment BID x 2 weeks\\nEpionce post-treatment kit (oil, cream cleanser, face cream) pt purchasing online Discontinue Regimen: Aklief & Amzeeq until rash is clear

## 2024-07-25 ENCOUNTER — PATIENT MESSAGE (OUTPATIENT)
Dept: DIABETES | Facility: CLINIC | Age: 69
End: 2024-07-25
Payer: MEDICARE

## 2024-08-21 ENCOUNTER — CLINICAL SUPPORT (OUTPATIENT)
Dept: AUDIOLOGY | Facility: CLINIC | Age: 69
End: 2024-08-21
Payer: MEDICARE

## 2024-08-21 ENCOUNTER — OFFICE VISIT (OUTPATIENT)
Dept: OTOLARYNGOLOGY | Facility: CLINIC | Age: 69
End: 2024-08-21
Payer: MEDICARE

## 2024-08-21 DIAGNOSIS — H61.23 BILATERAL IMPACTED CERUMEN: ICD-10-CM

## 2024-08-21 DIAGNOSIS — H93.A2 PULSATILE TINNITUS OF LEFT EAR: ICD-10-CM

## 2024-08-21 DIAGNOSIS — H91.91 LOW FREQUENCY HEARING LOSS OF RIGHT EAR: ICD-10-CM

## 2024-08-21 DIAGNOSIS — H93.A9 PULSATILE TINNITUS, UNSPECIFIED EAR: ICD-10-CM

## 2024-08-21 DIAGNOSIS — Z96.22 PATENT PRESSURE EQUALIZATION (PE) TUBE ON RIGHT SIDE: Primary | ICD-10-CM

## 2024-08-21 DIAGNOSIS — H69.93 ETD (EUSTACHIAN TUBE DYSFUNCTION), BILATERAL: Primary | ICD-10-CM

## 2024-08-21 DIAGNOSIS — H93.12 TINNITUS, LEFT: ICD-10-CM

## 2024-08-21 PROCEDURE — 92567 TYMPANOMETRY: CPT | Mod: S$GLB,,,

## 2024-08-21 PROCEDURE — 92557 COMPREHENSIVE HEARING TEST: CPT | Mod: S$GLB,,,

## 2024-08-21 PROCEDURE — 99999 PR PBB SHADOW E&M-EST. PATIENT-LVL II: CPT | Mod: PBBFAC,,, | Performed by: NURSE PRACTITIONER

## 2024-08-21 RX ORDER — GLIPIZIDE 10 MG/1
10 TABLET, FILM COATED, EXTENDED RELEASE ORAL
COMMUNITY
Start: 2024-08-15 | End: 2024-08-26

## 2024-08-21 RX ORDER — ROSUVASTATIN CALCIUM 10 MG/1
10 TABLET, COATED ORAL
COMMUNITY
Start: 2024-08-15 | End: 2024-08-26 | Stop reason: SDUPTHER

## 2024-08-21 NOTE — PROGRESS NOTES
Catalina Mcgarry, a 69 y.o. female, was seen today in the clinic for an audiologic evaluation.  The patient's main complaint was an intermittent thumping noise in her left ear.  She reported she had a PE tube placed in the right ear and was supposed to have a PE tube placed in the left ear but did not due to insurance issues.  Ms. Mcgarry reported she has had one episode of vertigo in the past.  The patient denied aural fullness and otalgia.     Tympanometry revealed Type B (large ECV) in the right ear and Type C in the left ear.  Audiogram results revealed a mild low frequency hearing loss at 250 Hz rising to normal hearing sensitivity from 500-8000 Hz in the right ear and normal hearing sensitivity from 250-8000 Hz with a slight air bone gap at 1000 Hz in the left ear.  Speech reception thresholds were noted at 10 dB in the right ear and 10 dB in the left ear.  Speech discrimination scores were 96% in the right ear and 100% in the left ear.    Recommendations:  Otologic evaluation  Hearing protection when in noise  Annual audiogram or sooner if change in hearing is perceived

## 2024-08-21 NOTE — PROGRESS NOTES
"Subjective:   Catalina Mcgarry is a 69 y.o. female who was self-referred. She has a past medical history of Diabetes mellitus, Diabetes mellitus, type 2, and Headache(784.0). She reports about 1 year of a "thumping" sound in her left ear. She notes that it sounds like her heartbeat and is constant. It will fluctuate in severity, but has not noticed a pattern. She states she had the same problem in her right ear and Dr. Leiva recently placed a PE tube which improved the sound. She intended to have a tube placed in the left ear but her insurance would not cover this. She denies any headaches, burred vision or recent weight gain. She denies any otalgia, otorrhea, ear fullness/pressure,or vertigo associated with the pulsation sound. Feels her hearing is normal and equal bilaterally. There is not a family history of hearing loss at a young age. There is a prior history of ear surgery-PE tube AD. There is not a prior history of ear infections. There is not a history of ear trauma. She denies a history of significant noise exposure.     Past Medical History  She has a past medical history of Diabetes mellitus, Diabetes mellitus, type 2, and Headache(784.0).    Past Surgical History  She has a past surgical history that includes Gum surgery and Breast biopsy.    Family History  Her family history includes Breast cancer in her maternal grandmother; Diabetes in her maternal aunt and son.    Social History  She reports that she has been smoking cigarettes. She has never used smokeless tobacco. She reports that she does not drink alcohol and does not use drugs.    Allergies  She has No Known Allergies.    Medications  She has a current medication list which includes the following prescription(s): ascorbic acid (vitamin c), blood sugar diagnostic, dexcom g6 sensor, dexcom g6 transmitter, fluticasone propionate, baqsimi, insulin aspart u-100, fiasp u-100 insulin, lantus solostar u-100 insulin, omnipod 5 g6 pods (gen 5), lancets, " pen needle, diabetic, and rosuvastatin.  Review of Systems     Constitutional: Negative for chills and fever.      HENT: Positive for ringing in the ears (pulsatile AS).  Negative for ear discharge, ear infection, ear pain and hearing loss.      Neurological: Negative for dizziness, headaches, light-headedness, seizures and tremors.          Objective:     Constitutional:   She is oriented to person, place, and time. She appears well-developed and well-nourished. She appears alert. She is cooperative.  Non-toxic appearance. She does not have a sickly appearance. She does not appear ill. Normal speech.      Head:  Normocephalic and atraumatic. Not macrocephalic and not microcephalic. Head is without abrasion, without right periorbital erythema, without left periorbital erythema and without TMJ tenderness.     Ears:    Right Ear: No drainage, swelling or tenderness. No mastoid tenderness. Tympanic membrane is not scarred, not perforated, not erythematous, not retracted and not bulging. No middle ear effusion. A PE tube is seen.   Left Ear: No drainage, swelling or tenderness. No mastoid tenderness. Tympanic membrane is not scarred, not perforated, not erythematous, not retracted and not bulging.  No middle ear effusion.   Ears:    Ceruminous debris obstructing bilateral TMs removed under microscopy    No carotid, pre-auricular or post auricular bruits auscultated.    Sound decreased with slight left sided neck compression.     Pulmonary/Chest:   Effort normal.     Psychiatric:   She has a normal mood and affect. Her speech is normal and behavior is normal.     Neurological:   She is alert and oriented to person, place, and time.     Procedure  Cerumen removal performed.  See procedure note.  Procedure Note:  The patient was brought to the minor procedure room and placed under the operating microscope of the left ear canal which was cleaned of ceruminous debris. Using a combination of suction, curettes and cup forceps  the patient's cerumen was removed. The patient tolerated the procedure well. There were no complications.  Procedure Note:  The patient was brought to the minor procedure room and placed under the operating microscope of the right ear canal which was cleaned of ceruminous debris. Using a combination of suction, curettes and cup forceps the patient's cerumen was removed. The patient tolerated the procedure well. There were no complications.    Audiogram    I independently reviewed the tracings of the complete audiometric evaluation. I reviewed the audiogram with the patient as well. Pertinent findings include mild low frequency hearing loss at 250 Hz rising to normal hearing sensitivity from 500-8000 Hz in the right ear and normal hearing sensitivity from 250-8000 Hz with a slight air bone gap at 1000 Hz in the left ear. Type B (large ECV) in the right ear and Type C in the left ear.  Assessment:     1. Patent pressure equalization (PE) tube on right side    2. Pulsatile tinnitus of left ear    3. Pulsatile tinnitus, unspecified ear    4. Bilateral impacted cerumen      Plan:     Patent pressure equalization (PE) tube on right side  Follow-up for routine ear checks or sooner if symptomatic.     Pulsatile tinnitus of left ear  Pulsatile tinnitus, unspecified ear  Tinnitus decreased with slight venous compression. No bruits auscultated.  Likely turbulent blood flow/venous hum, but imaging ordered for further evaluation given chronicity and patients concern. Reassurance provided.   -     CT Temporal Bone without contrast; Future    Bilateral impacted cerumen  Cerumen impaction removed under microscopy. Patient tolerated procedure well.

## 2024-08-23 NOTE — PROGRESS NOTES
CC:   Chief Complaint   Patient presents with    Diabetes Mellitus       HPI: Catalina Mcgarry is a 69 y.o. female presents for an initial visit today for the management of T1DM  She was previously seen by Abby Conklin NP. This is her first visit with me today. Seen last in 2/2024    She was diagnosed with Type 1 diabetes during pregnancy in 1978 and started on insulin therapy     Family hx of diabetes: mother had T2DM, son with T1DM,   Hospitalized for diabetes?   Insulin therapy?    No personal or FH of thyroid cancer or personal of pancreatic cancer or pancreatitis.     2/2011--- +FLORA and c-peptide <0.1   9/2023---c-peptide - <0.08           She presents wearing the Omnipod 5 and Dexcom G6  She does not feel confident with her carbohydrate counting  She fears hypoglycemia  She has not been taking the glipizide that was prescribed by her outside PCP  She also has not been taking the Crestor  She is due for lab work                            DIABETES COMPLICATIONS: retinopathy      Diabetes Management Status    ASA:  No    Statin: not taking the Crestor 10 mg   ACE/ARB: Not taking    The 10-year ASCVD risk score (Dalia LIRA, et al., 2019) is: 43.6%    Values used to calculate the score:      Age: 69 years      Sex: Female      Is Non- : Yes      Diabetic: Yes      Tobacco smoker: Yes      Systolic Blood Pressure: 130 mmHg      Is BP treated: No      HDL Cholesterol: 56 mg/dL      Total Cholesterol: 202 mg/dL      Screening or Prevention Patient's value Goal Complete/Controlled?   HgA1C Testing and Control   Lab Results   Component Value Date    HGBA1C 7.8 (H) 09/20/2023      Annually/Less than 8% Yes   Lipid profile : 09/20/2023 Annually Yes   LDL control Lab Results   Component Value Date    LDLCALC 132.4 09/20/2023    Annually/Less than 100 mg/dl  No   Nephropathy screening Lab Results   Component Value Date    LABMICR 19.0 09/20/2023     Lab Results   Component Value Date    PROTEINUA  Negative 09/29/2023    Annually Yes   Blood pressure BP Readings from Last 1 Encounters:   08/26/24 130/61    Less than 140/90 Yes   Dilated retinal exam : 06/17/2014 Annually No   Foot exam   : 08/26/2024 Annually Yes       CURRENT A1C:    Hemoglobin A1C   Date Value Ref Range Status   09/20/2023 7.8 (H) 4.0 - 5.6 % Final     Comment:     ADA Screening Guidelines:  5.7-6.4%  Consistent with prediabetes  >or=6.5%  Consistent with diabetes    High levels of fetal hemoglobin interfere with the HbA1C  assay. Heterozygous hemoglobin variants (HbS, HgC, etc)do  not significantly interfere with this assay.   However, presence of multiple variants may affect accuracy.     03/17/2011 10.7 (H) 4.0 - 6.2 % Final       GOAL A1C: 7% without hypoglycemia       DM MEDICATIONS USED IN THE PAST: Glipizide   Dexcom G6   Omnipod 5   Lantus   Novolog   Irving        CURRENT DIABETES MEDICATIONS: Glipizide 10 mg daily -- she is prescribed but not taking the glipizide     Insulin Pump: Omnipod 5 with Novolog   Pump settings:  Basal:0.65 units/hr   ICR:1:9   ISF: 1:50  Target: 110  IOB: 3 hours         Pump site change: q 3 days   Cartridge change: q 3 days  Insulin TDD: 33.1 units    Basal 65%   Bolus 35%   CHO intake: 96.1g   2.5 entries per day     100% in auto mode     Back up Lantus/Levemir: Lantus     Patient's pump was downloaded in clinic today and reviewed with patient.   Please see attached documents for pump download.         BLOOD GLUCOSE MONITORING:    Sensor type: Dexcom G6    Average BG reading:  160  Time in range: 70%   GMI- 7.1%   28% high   2% very high   0% low   <1% very low   Limits:   Site change:  q10 days    2 weeks prior average was 184  GMI- 7.7%   50% in range     Sensor was downloaded in clinic today and reviewed with patient.   Please see attached document for download.       Supplies from [facility]         HYPOGLYCEMIA: 0% low   <1% very low     2 weeks prior- 0% low  0% very low   Glucagon kit:  Medic  alert bracelet:        MEALS: eating 3 meals per day   BF:  Lunch:   Dinner:-- she sits with Mrs Lee every day from 4PM- 8PM -- sometimes Rosa skips dinner- so she will have a tuna fish sandwich   Snack:  Drinks: milk that is 18 grams of carbohydrates        CURRENT EXERCISE:  No      Review of Systems  Review of Systems   Constitutional:  Negative for appetite change, fatigue and unexpected weight change.   HENT:  Negative for trouble swallowing.    Eyes:  Negative for visual disturbance.   Respiratory:  Negative for shortness of breath.    Cardiovascular:  Negative for chest pain.   Gastrointestinal:  Negative for nausea.   Endocrine: Negative for polydipsia, polyphagia and polyuria.   Genitourinary:         No Nocturia    Skin:  Negative for wound.   Neurological:  Negative for numbness.       Physical Exam   Physical Exam  Vitals and nursing note reviewed.   Constitutional:       General: She is not in acute distress.     Appearance: She is well-developed. She is not ill-appearing.      Comments: Overweight    HENT:      Head: Normocephalic and atraumatic.      Right Ear: External ear normal.      Left Ear: External ear normal.      Nose: Nose normal.   Neck:      Thyroid: No thyromegaly.      Trachea: No tracheal deviation.   Cardiovascular:      Rate and Rhythm: Normal rate and regular rhythm.      Heart sounds: No murmur heard.  Pulmonary:      Effort: Pulmonary effort is normal. No respiratory distress.      Breath sounds: Normal breath sounds.   Abdominal:      Palpations: Abdomen is soft.      Tenderness: There is no abdominal tenderness.      Hernia: No hernia is present.   Musculoskeletal:      Cervical back: Normal range of motion and neck supple.   Skin:     General: Skin is warm and dry.      Capillary Refill: Capillary refill takes less than 2 seconds.      Findings: No rash.      Comments: Pump sites and dexcom sites are normal appearing. No lipo hypertropthy or atrophy     Neurological:       "Mental Status: She is alert and oriented to person, place, and time.      Cranial Nerves: No cranial nerve deficit.   Psychiatric:         Behavior: Behavior normal.         Judgment: Judgment normal.         FOOT EXAMINATION: appropriate footwear   She deferred a foot exam         Lab Results   Component Value Date    TSH 0.560 02/24/2011           Type 1 diabetes mellitus with hyperglycemia  Uncontrolled   Sounds like she needs to work on carbohydrate counting  Discussed applications that she can download---my fitness pal and calorie Julio  I also provided her with printed material  Will have her follow up with diabetes education for reinforcement  She feels like the NovoLog is not working fast enough---will change her to Fiasp         -- Medication Changes: Stop glipizide- she is a T1DM    Continue Omnipod 5  Change NovoLog to Fiasp   Work on carbohydrate counting---discuss references---will have her follow up with diabetes education for reinforcement  Adjusted her basal rate to 0.85 units/hr when she is in manual mode---based on her download from today  Stay in automated mode 100% of the time  Reviewed correction boluses---discussed hitting "use sensor"         -- Reviewed goals of therapy are to get the best control we can without hypoglycemia.  -- Reviewed patient's current insulin regimen. Clarified proper insulin dose and timing in relation to meals, etc. Insulin injection sites and proper rotation instructed.    -- Advised frequent self blood glucose monitoring.  Patient encouraged to document glucose results and bring them to every clinic visit.--continue the Dexcom G6--supplies come from [employer] [facility]--when her Omnipod is available to Dexcom G7 she will upgrade  -- Hypoglycemia precautions discussed. Instructed on precautions before driving.    -- Call for Bg repeatedly < 70 or > 180.   -- Close adherence to lifestyle changes recommended.   -- Periodic follow ups for eye evaluations, foot care and dental " care suggested.  -- Refer to diabetes education---to review carbohydrate counting and pump optimization      Patient has diabetes mellitus and manages diabetes with intensive insulin regimen and uses prandial and basal insulin daily-- via omnipod 5 insulin pump   Patient requires a therapeutic CGM and is willing to use therapeutic CGM for the necessary frequent adjustments of insulin therapy.  I have completed an in-person visit during the previous 6 months and will continue to have in-person visits every 6 months to assess adherence to their CGM regimen and diabetes treatment plan.  Due to COVID pandemic and need for remote monitoring this tool is medically necessary        Proliferative diabetic retinopathy, both eyes  Optimize BG readings.   See above.   Continue to follow ophthalmology    Diabetic macular edema, both eyes  See above    Insulin pump in place  See above    Insulin pump fitting or adjustment  See above    Dyslipidemia, goal LDL below 100  Discussed ADA recommendations  She has been noncompliant with the Crestor  Encouraged her to become compliant with Crestor  LDL goal <100    Overweight (BMI 25.0-29.9)  Body mass index is 26.04 kg/m².  Increases insulin resistance.   Discussed DM diet and exercise.             Pump backup plan    If the insulin pump is non functional and discontinued for anticipated more than 20 hours, please give daily injections of:   Long acting insulin Lantus 22 units daily   Short acting insulin Novolog or Fiasp for meals according to carb ratios and sensitivity factor in the pump. 1:9    When the insulin pump is restarted, do not restart basal rates until at least 22 hours after the last long acting insulin injection. You can set a 0% temporary basal setting that will last until this time and use your pump to bolus for meals and correction.     For any technical insulin pump issues, please contact the insulin pump company; the toll free number is printed on the label on the  back of the insulin pump.       If your sugar is running high for a few hours and does not respond to two correction doses from the insulin pump, it may mean that you have a bad pump site and the site should be changed ,    I spent a total of 60 minutes on the day of the visit.This includes face to face time and non-face to face time preparing to see the patient (eg, review of tests), obtaining and/or reviewing separately obtained history, documenting clinical information in the electronic or other health record, independently interpreting results and communicating results to the patient/family/caregiver, or care coordinator.          Follow up in about 3 months (around 11/26/2024).  1. Fasting labs today please   2. Schedule with jadiel for CHO counting  3. Follow up with me in 3 months -- will order labs prior - after her labs result           Orders Placed This Encounter   Procedures    Hemoglobin A1C     Standing Status:   Future     Number of Occurrences:   1     Standing Expiration Date:   2/26/2026    Comprehensive Metabolic Panel     Standing Status:   Future     Number of Occurrences:   1     Standing Expiration Date:   2/26/2026    CBC Auto Differential     Standing Status:   Future     Number of Occurrences:   1     Standing Expiration Date:   2/26/2026    Lipid Panel     Standing Status:   Future     Number of Occurrences:   1     Standing Expiration Date:   2/26/2026    Microalbumin/Creatinine Ratio, Urine     Standing Status:   Future     Number of Occurrences:   1     Standing Expiration Date:   2/26/2026     Order Specific Question:   Specimen Source     Answer:   Urine    TSH     Standing Status:   Future     Number of Occurrences:   1     Standing Expiration Date:   2/26/2026    Ambulatory referral/consult to Diabetes Education     Standing Status:   Future     Standing Expiration Date:   2/26/2026     Referral Priority:   Routine     Referral Type:   Consultation     Referral Reason:   Specialty  Services Required     Referred to Provider:   Neli Horn RD, CDE     Requested Specialty:   Diabetes     Number of Visits Requested:   1       Recommendations were discussed with the patient in detail  The patient verbalized understanding and agrees with the plan outlined as above.     This note was partly generated with [employer] voice recognition software. I apologize for any possible typographical errors.

## 2024-08-26 ENCOUNTER — TELEPHONE (OUTPATIENT)
Dept: DIABETES | Facility: CLINIC | Age: 69
End: 2024-08-26

## 2024-08-26 ENCOUNTER — OFFICE VISIT (OUTPATIENT)
Dept: DIABETES | Facility: CLINIC | Age: 69
End: 2024-08-26
Payer: MEDICARE

## 2024-08-26 ENCOUNTER — PATIENT MESSAGE (OUTPATIENT)
Dept: DIABETES | Facility: CLINIC | Age: 69
End: 2024-08-26

## 2024-08-26 VITALS
HEIGHT: 63 IN | HEART RATE: 80 BPM | BODY MASS INDEX: 26.05 KG/M2 | OXYGEN SATURATION: 97 % | DIASTOLIC BLOOD PRESSURE: 61 MMHG | SYSTOLIC BLOOD PRESSURE: 130 MMHG | WEIGHT: 147 LBS

## 2024-08-26 DIAGNOSIS — Z46.81 INSULIN PUMP FITTING OR ADJUSTMENT: ICD-10-CM

## 2024-08-26 DIAGNOSIS — Z96.41 INSULIN PUMP IN PLACE: ICD-10-CM

## 2024-08-26 DIAGNOSIS — E10.65 TYPE 1 DIABETES MELLITUS WITH HYPERGLYCEMIA: Primary | ICD-10-CM

## 2024-08-26 DIAGNOSIS — Z71.9 HEALTH EDUCATION/COUNSELING: ICD-10-CM

## 2024-08-26 DIAGNOSIS — E11.311 DIABETIC MACULAR EDEMA, BOTH EYES: ICD-10-CM

## 2024-08-26 DIAGNOSIS — Z96.41 DIABETES MELLITUS TYPE 1, WITH COMPLICATION, ON LONG TERM INSULIN PUMP: ICD-10-CM

## 2024-08-26 DIAGNOSIS — E78.5 DYSLIPIDEMIA, GOAL LDL BELOW 100: ICD-10-CM

## 2024-08-26 DIAGNOSIS — E66.3 OVERWEIGHT (BMI 25.0-29.9): ICD-10-CM

## 2024-08-26 DIAGNOSIS — E10.3513 PROLIFERATIVE DIABETIC RETINOPATHY OF BOTH EYES WITH MACULAR EDEMA ASSOCIATED WITH TYPE 1 DIABETES MELLITUS: ICD-10-CM

## 2024-08-26 DIAGNOSIS — Z91.148 NONCOMPLIANCE WITH MEDICATIONS: ICD-10-CM

## 2024-08-26 DIAGNOSIS — Z91.148 NONCOMPLIANCE W/MEDICATION TREATMENT DUE TO INTERMIT USE OF MEDICATION: ICD-10-CM

## 2024-08-26 DIAGNOSIS — E10.8 DIABETES MELLITUS TYPE 1, WITH COMPLICATION, ON LONG TERM INSULIN PUMP: ICD-10-CM

## 2024-08-26 PROCEDURE — 3075F SYST BP GE 130 - 139MM HG: CPT | Mod: CPTII,S$GLB,, | Performed by: NURSE PRACTITIONER

## 2024-08-26 PROCEDURE — 99999 PR PBB SHADOW E&M-EST. PATIENT-LVL V: CPT | Mod: PBBFAC,,, | Performed by: NURSE PRACTITIONER

## 2024-08-26 PROCEDURE — 1101F PT FALLS ASSESS-DOCD LE1/YR: CPT | Mod: CPTII,S$GLB,, | Performed by: NURSE PRACTITIONER

## 2024-08-26 PROCEDURE — 1126F AMNT PAIN NOTED NONE PRSNT: CPT | Mod: CPTII,S$GLB,, | Performed by: NURSE PRACTITIONER

## 2024-08-26 PROCEDURE — 3008F BODY MASS INDEX DOCD: CPT | Mod: CPTII,S$GLB,, | Performed by: NURSE PRACTITIONER

## 2024-08-26 PROCEDURE — 1160F RVW MEDS BY RX/DR IN RCRD: CPT | Mod: CPTII,S$GLB,, | Performed by: NURSE PRACTITIONER

## 2024-08-26 PROCEDURE — 3288F FALL RISK ASSESSMENT DOCD: CPT | Mod: CPTII,S$GLB,, | Performed by: NURSE PRACTITIONER

## 2024-08-26 PROCEDURE — 95251 CONT GLUC MNTR ANALYSIS I&R: CPT | Mod: S$GLB,,, | Performed by: NURSE PRACTITIONER

## 2024-08-26 PROCEDURE — 3078F DIAST BP <80 MM HG: CPT | Mod: CPTII,S$GLB,, | Performed by: NURSE PRACTITIONER

## 2024-08-26 PROCEDURE — 1159F MED LIST DOCD IN RCRD: CPT | Mod: CPTII,S$GLB,, | Performed by: NURSE PRACTITIONER

## 2024-08-26 PROCEDURE — 99215 OFFICE O/P EST HI 40 MIN: CPT | Mod: S$GLB,,, | Performed by: NURSE PRACTITIONER

## 2024-08-26 RX ORDER — INSULIN ASPART INJECTION 100 [IU]/ML
INJECTION, SOLUTION SUBCUTANEOUS
Qty: 30 ML | Refills: 11 | Status: SHIPPED | OUTPATIENT
Start: 2024-08-26

## 2024-08-26 RX ORDER — INSULIN GLARGINE 100 [IU]/ML
22 INJECTION, SOLUTION SUBCUTANEOUS DAILY
Qty: 15 ML | Refills: 4 | Status: SHIPPED | OUTPATIENT
Start: 2024-08-26

## 2024-08-26 RX ORDER — ROSUVASTATIN CALCIUM 10 MG/1
10 TABLET, COATED ORAL NIGHTLY
Qty: 90 TABLET | Refills: 2 | Status: SHIPPED | OUTPATIENT
Start: 2024-08-26

## 2024-08-26 RX ORDER — INSULIN ASPART 100 [IU]/ML
INJECTION, SOLUTION INTRAVENOUS; SUBCUTANEOUS
Qty: 15 ML | Refills: 6 | Status: SHIPPED | OUTPATIENT
Start: 2024-08-26

## 2024-08-26 RX ORDER — INSULIN PMP CART,AUT,G6/7,CNTR
1 EACH SUBCUTANEOUS
Qty: 30 EACH | Refills: 3 | Status: SHIPPED | OUTPATIENT
Start: 2024-08-26

## 2024-08-26 RX ORDER — GLUCAGON 3 MG/1
1 POWDER NASAL DAILY PRN
Qty: 2 EACH | Refills: 1 | Status: SHIPPED | OUTPATIENT
Start: 2024-08-26

## 2024-08-26 NOTE — TELEPHONE ENCOUNTER
----- Message from Susan Estrada LPN sent at 8/26/2024 10:30 AM CDT -----  Please reorder urine  lab for pt , they forgot to take urine and order was released, she will come back tomorrow

## 2024-08-26 NOTE — Clinical Note
1. Fiasp PA  2. Send orders to [facility] for updated Dexcom G6 orders please-- change me to provider

## 2024-08-26 NOTE — ASSESSMENT & PLAN NOTE
Body mass index is 26.04 kg/m².  Increases insulin resistance.   Discussed DM diet and exercise.

## 2024-08-26 NOTE — ASSESSMENT & PLAN NOTE
"Uncontrolled   Sounds like she needs to work on carbohydrate counting  Discussed applications that she can download---my fitness pal and calorie Julio  I also provided her with printed material  Will have her follow up with diabetes education for reinforcement  She feels like the NovoLog is not working fast enough---will change her to Fiasp         -- Medication Changes: Stop glipizide- she is a T1DM    Continue Omnipod 5  Change NovoLog to Fiasp   Work on carbohydrate counting---discuss references---will have her follow up with diabetes education for reinforcement  Adjusted her basal rate to 0.85 units/hr when she is in manual mode---based on her download from today  Stay in automated mode 100% of the time  Reviewed correction boluses---discussed hitting "use sensor"         -- Reviewed goals of therapy are to get the best control we can without hypoglycemia.  -- Reviewed patient's current insulin regimen. Clarified proper insulin dose and timing in relation to meals, etc. Insulin injection sites and proper rotation instructed.    -- Advised frequent self blood glucose monitoring.  Patient encouraged to document glucose results and bring them to every clinic visit.--continue the Dexcom G6--supplies come from [employer]--when her Omnipod is available to Dexcom G7 she will upgrade  -- Hypoglycemia precautions discussed. Instructed on precautions before driving.    -- Call for Bg repeatedly < 70 or > 180.   -- Close adherence to lifestyle changes recommended.   -- Periodic follow ups for eye evaluations, foot care and dental care suggested.  -- Refer to diabetes education---to review carbohydrate counting and pump optimization      Patient has diabetes mellitus and manages diabetes with intensive insulin regimen and uses prandial and basal insulin daily-- via omnipod 5 insulin pump   Patient requires a therapeutic CGM and is willing to use therapeutic CGM for the necessary frequent adjustments of insulin therapy.  I " have completed an in-person visit during the previous 6 months and will continue to have in-person visits every 6 months to assess adherence to their CGM regimen and diabetes treatment plan.  Due to COVID pandemic and need for remote monitoring this tool is medically necessary

## 2024-08-26 NOTE — ASSESSMENT & PLAN NOTE
Discussed ADA recommendations  She has been noncompliant with the Crestor  Encouraged her to become compliant with Crestor  LDL goal <100

## 2024-08-28 ENCOUNTER — PATIENT MESSAGE (OUTPATIENT)
Dept: DIABETES | Facility: CLINIC | Age: 69
End: 2024-08-28
Payer: MEDICARE

## 2024-08-28 ENCOUNTER — TELEPHONE (OUTPATIENT)
Dept: DIABETES | Facility: CLINIC | Age: 69
End: 2024-08-28
Payer: MEDICARE

## 2024-08-28 NOTE — TELEPHONE ENCOUNTER
----- Message from Alisa Emerson NP sent at 8/28/2024  1:40 PM CDT -----  Repeat labs prior to follow-up visit please   She has an appointment scheduled in 3 weeks at [facility]- she does not need to see both myself and an endocrinologist unless she wants to!  Let me know!

## 2024-09-04 ENCOUNTER — TELEPHONE (OUTPATIENT)
Dept: DIABETES | Facility: CLINIC | Age: 69
End: 2024-09-04
Payer: MEDICARE

## 2024-09-05 ENCOUNTER — CLINICAL SUPPORT (OUTPATIENT)
Dept: DIABETES | Facility: CLINIC | Age: 69
End: 2024-09-05
Payer: MEDICARE

## 2024-09-05 DIAGNOSIS — E10.65 TYPE 1 DIABETES MELLITUS WITH HYPERGLYCEMIA: ICD-10-CM

## 2024-09-05 PROCEDURE — 99999 PR PBB SHADOW E&M-EST. PATIENT-LVL III: CPT | Mod: PBBFAC,,, | Performed by: DIETITIAN, REGISTERED

## 2024-09-05 PROCEDURE — G0108 DIAB MANAGE TRN  PER INDIV: HCPCS | Mod: S$GLB,,, | Performed by: DIETITIAN, REGISTERED

## 2024-09-09 NOTE — PROGRESS NOTES
Diabetes Care Specialist Progress Note  Author: Neli Horn RD, CDE  Date: 9/9/2024    Intake    Program Intake  Reason for Diabetes Program Visit:: Intervention  Type of Intervention:: Individual  Individual: Education  Current diabetes risk level:: moderate  In the last 12 months, have you:: used emergency room services  Was the ER or hospital admission related to diabetes?: No  Permission to speak with others about care:: no    Current Diabetes Treatment: Insulin  Method of insulin delivery?: Insulin Pump  Type of Pump: omnipod 5  Does patient have back-up plan?: Yes (lantus 22 units a day and novolog ICR 1:9 TID AC + correction)  Any problems obtaining supplies?: No    Continuous Glucose Monitoring  Patient has CGM: Yes  Personal CGM type:: Dexcom G6  GMI Date: 09/09/24  GMI Value: 7.2 %    Lab Results   Component Value Date    HGBA1C 7.6 (H) 08/26/2024               There is no height or weight on file to calculate BMI.    Lifestyle Coping Support & Clinical    Lifestyle/Coping/Support  Compared to other people your age, how would you rate your health?: Good  Does anyone in your family have diabetes or does anyone in your family support you in your diabetes care?: FH: mother and son, Support System: family, children  Learning Barriers:: None  Culture or Baptist beliefs that may impact ability to access healthcare: No  Psychosocial/Coping Skills Assessment Completed: : Yes  Assessment indicates:: Adequate understanding  Area of need?: No    Problem Review  Active Comorbidities: Retinopathy    Diabetes Self-Management Skills Assessment    Medication Skills Assessment  Patient is able to identify current diabetes medications, dosages, and appropriate timing of medications.: yes  Patient reports problems or concerns with current medication regimen.: no  Patient is  aware that some diabetes medications can cause low blood sugar?: Yes  Medication Skills Assessment Completed:: Yes  Assessment indicates:: Adequate  understanding  Area of need?: No    Diabetes Disease Process/Treatment Options  Diabetes Type?: Type I  When were you diagnosed?: 1978  If previous diabetes education, when/where:: after diagnosis and with [facility] DM team  What are your goals for this education session?: review carb counting and insulin pumps  Is patient aware of what causes diabetes?: Yes  Does patient understand the pathophysiology of diabetes?: Yes  Diabetes Disease Process/Treatment Options: Skills Assessment Completed: Yes  Assessment indicates:: Adequate understanding  Area of need?: No    Nutrition/Healthy Eating  Meal Plan 24 Hour Recall - Breakfast: sausage, grits, eggs, or pancakes, sausage, and eggs  Meal Plan 24 Hour Recall - Lunch: something like rot chicken, mashed potatoes, and broccoli  Meal Plan 24 Hour Recall - Dinner: 5 chicken nuggest (made a t home) and a salad  Meal Plan 24 Hour Recall - Snack: none  Meal Plan 24 Hour Recall - Beverage: minute maid zero, fairlife milk  Who shops/cooks?: self  Patient can identify foods that impact blood sugar.: yes  Nutrition/Healthy Eating Skills Assessment Completed:: Yes  Assessment indicates:: Adequate understanding  Area of need?: Yes    Physical Activity/Exercise  Patient's daily activity level:: moderately active  Patient formally exercises outside of work.: no  Reasons for not exercising:: time constraints  Patient can identify forms of physical activity.: yes  Physical Activity/Exercise Skills Assessment Completed: : Yes  Assessment indicates:: Adequate understanding  Area of need?: No    Home Blood Glucose Monitoring  Patient states that blood sugar is checked at home daily.: yes  Monitoring Method:: personal continuous glucose monitor  Personal CGM type:: Dexcom G6   What is your current Time in Range?: 68%  What is your A1c Target?: 7.0  Home Blood Glucose Monitoring Skills Assessment Completed: : Yes  Assessment indicates:: Adequate understanding  Area of need?: No    Acute  Complications  Have you ever had hypoglycemia (low BG 70 or less)?: yes  How often and what are your symptoms?: not often shaky  How do you treat hypoglycemia?: juice or sweets  Have you ever had hyperglycemia (high  or more)?: yes  How often and what are your symptoms?: it's been a while, tired  How do you treat hyperglycemia? : take insulin  Have you ever had DKA?: no  Do you ever test for ketones?: no  Do you have a sick day plan?: no  Acute Complications Skills Assessment Completed: : Yes  Assessment indicates:: Adequate understanding  Area of need?: No    Chronic Complications  Reviewed health maintenance: yes  Have you completed your annual diabetes maintenance labwork? : yes  Do you examine your feet daily?: yes  Has your doctor examined your feet?: yes  Do you see a Dentist?: yes  Do you see an eye doctor?: yes  Chronic Complications Skills Assessment Completed: : Yes  Assessment indicates:: Adequate understanding  Area of need?: No      Assessment Summary and Plan    Based on today's diabetes care assessment, the following areas of need were identified:          9/5/2024    12:02 AM   Areas of Need   Medications/Current Diabetes Treatment No   Lifestyle Coping Support No   Diabetes Disease Process/Treatment Options No   Nutrition/Healthy Eating Yes, patient is using the BridgeLux ramesh to get carbohydrate content for foods to enter into the omnipod 5  Focused education on serving sizes and estimating, also discussed the addition of the BridgeLux to the omnipod ramesh   Physical Activity/Exercise No   Home Blood Glucose Monitoring No   Acute Complications No   Chronic Complications No       Today's interventions were provided through individual discussion, instruction, and written materials were provided.      Patient verbalized understanding of instruction and written materials.  Pt was able to return back demonstration of instructions today. Patient understood key points, needs reinforcement and  further instruction.     Diabetes Self-Management Care Plan:    Today's Diabetes Self-Management Care Plan was developed with Catalina's input. Catalina has agreed to work toward the following goal(s) to improve his/her overall diabetes control.      Care Plan: Diabetes Management   Updates made since 8/10/2024 12:00 AM        Problem: Medications         Long-Range Goal: Use Omnipod 5/Dexcom as prescribed    Start Date: 10/3/2023   Expected End Date: 4/3/2024   This Visit's Progress: On track   Priority: High   Barriers: No Barriers Identified        Task: Omnipod and Dexcom data was downloaded and reviewed with patient and provider, any necessary adjustments were made         Task: Omnipod and Dexcom data was downloaded and reviewed with patient and provider, any necessary adjustments were made         Task: Omnipod and Dexcom data was downloaded and reviewed with patient and provider, any necessary adjustments were made           Follow Up Plan     Follow up in about 4 weeks (around 10/3/2024) for Personal CGM Upload, Insulin Pump Upload, General Follow-up.    Today's care plan and follow up schedule was discussed with patient.  Catailna verbalized understanding of the care plan, goals, and agrees to follow up plan.        The patient was encouraged to communicate with his/her health care provider/physician and care team regarding his/her condition(s) and treatment.  I provided the patient with my contact information today and encouraged to contact me via phone or [facility] Patient Portal as needed.     Length of Visit   Total Time: 60 Minutes

## 2024-09-23 ENCOUNTER — TELEPHONE (OUTPATIENT)
Dept: OTOLARYNGOLOGY | Facility: CLINIC | Age: 69
End: 2024-09-23
Payer: MEDICARE

## 2024-09-23 NOTE — TELEPHONE ENCOUNTER
This message was sent to Dr. Garcia's staff, however, patient has only see Ms. Ibrahima NP. Please reach out to patient for an appointment. Thanks, Анна

## 2024-09-23 NOTE — TELEPHONE ENCOUNTER
----- Message from Susan Estrada LPN sent at 9/23/2024 12:04 PM CDT -----  Please reach out to pt to schedule a visit having issues with ear

## 2024-09-25 ENCOUNTER — PATIENT MESSAGE (OUTPATIENT)
Dept: DIABETES | Facility: CLINIC | Age: 69
End: 2024-09-25
Payer: MEDICARE

## 2024-10-09 ENCOUNTER — PATIENT MESSAGE (OUTPATIENT)
Dept: OTOLARYNGOLOGY | Facility: CLINIC | Age: 69
End: 2024-10-09
Payer: MEDICARE

## 2024-10-09 ENCOUNTER — NUTRITION (OUTPATIENT)
Dept: DIABETES | Facility: CLINIC | Age: 69
End: 2024-10-09
Payer: MEDICARE

## 2024-10-09 DIAGNOSIS — E10.65 TYPE 1 DIABETES MELLITUS WITH HYPERGLYCEMIA: Primary | ICD-10-CM

## 2024-10-09 PROCEDURE — 99999 PR PBB SHADOW E&M-EST. PATIENT-LVL I: CPT | Mod: PBBFAC,,, | Performed by: DIETITIAN, REGISTERED

## 2024-10-09 PROCEDURE — G0108 DIAB MANAGE TRN  PER INDIV: HCPCS | Mod: S$GLB,,, | Performed by: DIETITIAN, REGISTERED

## 2024-10-11 NOTE — PROGRESS NOTES
Diabetes Care Specialist Follow-up Note  Author: Neli Horn RD, CDE  Date: 10/11/2024    Intake    Program Intake  Reason for Diabetes Program Visit:: Intervention  Type of Intervention:: Individual  Individual: Education  Education: Other  Current diabetes risk level:: moderate  In the last 12 months, have you:: used emergency room services  Was the ER or hospital admission related to diabetes?: No  Permission to speak with others about care:: no    Current Diabetes Treatment: Insulin  Method of insulin delivery?: Insulin Pump  Type of Pump: omnipod 5  Does patient have back-up plan?: Yes  Any problems obtaining supplies?: No    Continuous Glucose Monitoring  Patient has CGM: Yes  Personal CGM type:: Dexcom G6  GMI Date: 09/09/24  GMI Value: 7.2 %    Lab Results   Component Value Date    HGBA1C 7.6 (H) 08/26/2024     A1c Pre Diabetes Care Specialist Intervention:  7.6%    Physical activity/Exercise:   No change    SMBG: dexcom with omnipod      Lifestyle Coping Support & Clinical    Lifestyle/Coping/Support  Compared to other people your age, how would you rate your health?: Good  Psychosocial/Coping Skills Assessment Completed: : No  Assessment indicates:: Adequate understanding  Deffered due to:: Other (comment) (previously completed, there has been no change and patient has adequate understanding)  Area of need?: No    Problem Review  Active Comorbidities: Retinopathy    Diabetes Self-Management Skills Assessment    Medication Skills Assessment  Patient is able to identify current diabetes medications, dosages, and appropriate timing of medications.: yes  Patient reports problems or concerns with current medication regimen.: no  Patient is  aware that some diabetes medications can cause low blood sugar?: Yes  Medication Skills Assessment Completed:: Yes  Assessment indicates:: Adequate understanding  Area of need?: No    Diabetes Disease Process/Treatment Options  Diabetes Type?: Type I  Diabetes Disease  Process/Treatment Options: Skills Assessment Completed: No  Assessment indicates:: Adequate understanding  Deferred due to:: Other (comment) (previously completed, there has been no change and patient has adequate understanding)  Area of need?: No    Nutrition/Healthy Eating  Meal Plan 24 Hour Recall - Breakfast: sausage, grits, eggs, or pancakes, sausage, and eggs  Meal Plan 24 Hour Recall - Lunch: something like rot chicken, mashed potatoes, and broccoli  Meal Plan 24 Hour Recall - Dinner: likes a salad with some protein salad  Meal Plan 24 Hour Recall - Snack: none  Meal Plan 24 Hour Recall - Beverage: minute maid zero, fairlife milk  Who shops/cooks?: self  Patient can identify foods that impact blood sugar.: yes  Nutrition/Healthy Eating Skills Assessment Completed:: Yes  Assessment indicates:: Adequate understanding  Area of need?: No    Physical Activity/Exercise  Patient's daily activity level:: moderately active  Patient formally exercises outside of work.: no  Reasons for not exercising:: time constraints  Patient can identify forms of physical activity.: yes  Physical Activity/Exercise Skills Assessment Completed: : No  Assessment indicates:: Adequate understanding  Deffered due to:: Other (comment) (previously completed, there has been no change and patient has adequate understanding)  Area of need?: No    Home Blood Glucose Monitoring  Patient states that blood sugar is checked at home daily.: yes  Monitoring Method:: personal continuous glucose monitor  Personal CGM type:: Dexcom G6   What is your current Time in Range?: 68%  What is your A1c Target?: 7.0  Home Blood Glucose Monitoring Skills Assessment Completed: : No  Assessment indicates:: Adequate understanding  Deferred due to:: Other (comment) (previously completed, there has been no change and patient has adequate understanding)  Area of need?: No    Acute Complications  Have you ever had hypoglycemia (low BG 70 or less)?: yes  How often and  what are your symptoms?: not often shaky  How do you treat hypoglycemia?: juice or sweets  Have you ever had hyperglycemia (high  or more)?: yes  How often and what are your symptoms?: it's been a while, tired  How do you treat hyperglycemia? : take insulin  Have you ever had DKA?: no  Do you ever test for ketones?: no  Do you have a sick day plan?: no  Acute Complications Skills Assessment Completed: : No  Assessment indicates:: Adequate understanding  Deffered due to:: Other (comment) (previously completed, there has been no change and patient has adequate understanding)  Area of need?: No    Chronic Complications  Reviewed health maintenance: yes  Has your doctor examined your feet?: yes  Chronic Complications Skills Assessment Completed: : No  Assessment indicates:: Adequate understanding  Deferred due to:: Other (comment) (previously completed, there has been no change and patient has adequate understanding)  Area of need?: No      During today's follow-up visit,  the following areas required further assessment and content was provided/reviewed.    Based on today's diabetes care assessment, the following areas of need were identified:          10/11/2024   Areas of Need   Lifestyle Coping Support No   Diabetes Disease Process/Treatment Options No   Nutrition/Healthy Eating No, we talked about current dietary choices and healthier options   Physical Activity/Exercise No   Home Blood Glucose Monitoring No, downloaded reports and compared to previous reports  Bolusing typically 3 times a day with meal, occasionally will only bolus once or twice a day, not having any low blood sugars but does have some highs  Comparison of previous CGM data 8/26/2024 to current    Average Glucose 170 increased from 160  Standard Deviation 49 increased from 39  GMI 7.4 % increased from 7.1 %    Time in Range  7% of time patient was in Very High Range increased from 2%  29% of time patient was in High Range increased from  28%  64% of time patient was in Range decreased from 70%  0% of time patient was in Low Range no change from 0%  0% of time patient was in Very Low Range improved from <1%     Acute Complications No   Chronic Complications No          Today's interventions were provided through individual discussion, instruction, and written materials were provided.    Patient verbalized understanding of instruction and written materials.  Pt was able to return back demonstration of instructions today. Patient understood key points, needs reinforcement and further instruction.     Diabetes Self-Management Care Plan Review and Evaluation of Progress:    During today's follow-up Catalina's Diabetes Self-Management Care Plan progress was reviewed and progress was evaluated including his/her input. Catalina has agreed to continue his/her journey to improve/maintain overall diabetes control by continuing to set health goals. See care plan progress below.      Care Plan: Diabetes Management   Updates made since 9/11/2024 12:00 AM        Problem: Medications         Long-Range Goal: Use Omnipod 5/Dexcom as prescribed    Start Date: 10/3/2023   Expected End Date: 4/3/2024   This Visit's Progress: On track   Recent Progress: On track   Priority: High   Barriers: No Barriers Identified        Task: Omnipod and Dexcom data was downloaded and reviewed with patient and provider, any necessary adjustments were made Completed 10/11/2024          Follow Up Plan     Follow up in about 3 months (around 1/9/2025) for Insulin Pump Upload, Personal CGM Upload.    Today's care plan and follow up schedule was discussed with patient.  Catalina verbalized understanding of the care plan, goals, and agrees to follow up plan.        The patient was encouraged to communicate with his/her health care provider/physician and care team regarding his/her condition(s) and treatment.  I provided the patient with my contact information today and encouraged to contact me via  phone or [facility] Patient Portal as needed.     Length of Visit   Total Time: 65 Minutes

## 2024-10-15 ENCOUNTER — PATIENT MESSAGE (OUTPATIENT)
Dept: DIABETES | Facility: CLINIC | Age: 69
End: 2024-10-15
Payer: MEDICARE

## 2024-10-16 ENCOUNTER — PATIENT OUTREACH (OUTPATIENT)
Dept: DIABETES | Facility: CLINIC | Age: 69
End: 2024-10-16
Payer: MEDICARE

## 2024-10-16 ENCOUNTER — PATIENT MESSAGE (OUTPATIENT)
Dept: DIABETES | Facility: CLINIC | Age: 69
End: 2024-10-16

## 2024-10-16 ENCOUNTER — TELEPHONE (OUTPATIENT)
Dept: DIABETES | Facility: CLINIC | Age: 69
End: 2024-10-16

## 2024-10-16 DIAGNOSIS — Z96.41 INSULIN PUMP IN PLACE: ICD-10-CM

## 2024-10-16 DIAGNOSIS — E10.65 TYPE 1 DIABETES MELLITUS WITH HYPERGLYCEMIA: Primary | ICD-10-CM

## 2024-10-16 NOTE — Clinical Note
Please call patient let her know that I reviewed the Omnipod 5 download Her readings looked okay but are still above goal She is having some prandial excursions--is she accurately carbohydrate counting??  She is only entering in about 79 g of carbs per day and most people eat more than that so I am thinking that were missing some carb entries Does she feel like she needs a tighter carbohydrate ratio??  I would say that she would need to just work on entering in the carbs and continue the same ratio Does she feel like she needs a tighter correction factor? Let me know

## 2024-10-16 NOTE — PROGRESS NOTES
"  Continuous Glucose Sensor Report of Personal Device    Catalina Mcgarry is a 69 y.o.female with type 1 diabetes     Interpretation of data from Dexcom G6- on the Omnipod download---reviewed from September 26, 2024 through October 9, 2024    Reason for review: Currently on anti-hyperglycemic therapy and failing to achieve glycemic goals.    Lab Results   Component Value Date    HGBA1C 7.6 (H) 08/26/2024         Current diabetes medications and doses:   Omnipod 5  See attached settings     Continue Omnipod 5  Change NovoLog to Fiasp   Work on carbohydrate counting---discuss references---will have her follow up with diabetes education for reinforcement  Adjusted her basal rate to 0.85 units/hr when she is in manual mode---based on her download from today  Stay in automated mode 100% of the time  Reviewed correction boluses---discussed hitting "use sensor"        ________________________________________________________________    SUMMARY of KEY FINDINGS:      Average glucose: 169  Above 180 mg/dL: 31%  (Above 250 mg/dL: 6%)  Within  mg/dL: 63%  Below 70 mg/dL: 0%  (Below 54 mg/dL: 0%)      CGM data reviewed and notable for the following trends:  Based on download she is very basal heavy with 75% coming from basal and only 25% bolus  She is entering in on average 79.1 g of carbs per day  In auto mode 99% of the time        Will make the following changes based on review of data:   Insure accurate carbohydrate counting  Consider tightening correction factor?  Very basal heavy---needs to start bolusing more        Alisa Emerson NP                          "

## 2024-10-16 NOTE — TELEPHONE ENCOUNTER
----- Message from Alisa Emerson NP sent at 10/16/2024 12:39 PM CDT -----  Please call patient let her know that I reviewed the Omnipod 5 download  Her readings looked okay but are still above goal  She is having some prandial excursions--is she accurately carbohydrate counting??  She is only entering in about 79 g of carbs per day and most people eat more than that so I am thinking that were missing some carb entries  Does she feel like she needs a tighter carbohydrate ratio??  I would say that she would need to just work on entering in the carbs and continue the same ratio  Does she feel like she needs a tighter correction factor?  Let me know

## 2024-10-28 ENCOUNTER — PATIENT MESSAGE (OUTPATIENT)
Dept: PRIMARY CARE CLINIC | Facility: CLINIC | Age: 69
End: 2024-10-28
Payer: MEDICARE

## 2024-10-30 ENCOUNTER — OFFICE VISIT (OUTPATIENT)
Dept: DIABETES | Facility: CLINIC | Age: 69
End: 2024-10-30
Payer: MEDICARE

## 2024-10-30 ENCOUNTER — PATIENT MESSAGE (OUTPATIENT)
Dept: OTOLARYNGOLOGY | Facility: CLINIC | Age: 69
End: 2024-10-30
Payer: MEDICARE

## 2024-10-30 VITALS
OXYGEN SATURATION: 95 % | BODY MASS INDEX: 24.7 KG/M2 | HEIGHT: 63 IN | DIASTOLIC BLOOD PRESSURE: 60 MMHG | HEART RATE: 95 BPM | SYSTOLIC BLOOD PRESSURE: 116 MMHG | WEIGHT: 139.38 LBS

## 2024-10-30 DIAGNOSIS — E10.3513 PROLIFERATIVE DIABETIC RETINOPATHY OF BOTH EYES WITH MACULAR EDEMA ASSOCIATED WITH TYPE 1 DIABETES MELLITUS: ICD-10-CM

## 2024-10-30 DIAGNOSIS — E11.311 DIABETIC MACULAR EDEMA, BOTH EYES: ICD-10-CM

## 2024-10-30 DIAGNOSIS — E10.65 TYPE 1 DIABETES MELLITUS WITH HYPERGLYCEMIA: Primary | ICD-10-CM

## 2024-10-30 DIAGNOSIS — Z71.9 HEALTH EDUCATION/COUNSELING: ICD-10-CM

## 2024-10-30 DIAGNOSIS — E78.5 DYSLIPIDEMIA, GOAL LDL BELOW 100: ICD-10-CM

## 2024-10-30 DIAGNOSIS — Z96.41 INSULIN PUMP IN PLACE: ICD-10-CM

## 2024-10-30 DIAGNOSIS — Z46.81 INSULIN PUMP FITTING OR ADJUSTMENT: ICD-10-CM

## 2024-10-30 PROBLEM — E66.3 OVERWEIGHT (BMI 25.0-29.9): Status: RESOLVED | Noted: 2024-08-26 | Resolved: 2024-10-30

## 2024-10-30 PROCEDURE — 99999 PR PBB SHADOW E&M-EST. PATIENT-LVL IV: CPT | Mod: PBBFAC,,, | Performed by: NURSE PRACTITIONER

## 2024-10-30 RX ORDER — ROSUVASTATIN CALCIUM 10 MG/1
10 TABLET, COATED ORAL NIGHTLY
Qty: 90 TABLET | Refills: 2 | Status: SHIPPED | OUTPATIENT
Start: 2024-10-30

## 2024-10-30 RX ORDER — INSULIN ASPART INJECTION 100 [IU]/ML
INJECTION, SOLUTION SUBCUTANEOUS
Qty: 30 ML | Refills: 11 | Status: SHIPPED | OUTPATIENT
Start: 2024-10-30

## 2024-10-30 RX ORDER — INSULIN PMP CART,AUT,G6/7,CNTR
1 EACH SUBCUTANEOUS
Qty: 6 EACH | Refills: 3 | Status: SHIPPED | OUTPATIENT
Start: 2024-10-30

## 2024-10-30 RX ORDER — INSULIN ASPART 100 [IU]/ML
INJECTION, SOLUTION INTRAVENOUS; SUBCUTANEOUS
Qty: 15 ML | Refills: 6 | Status: SHIPPED | OUTPATIENT
Start: 2024-10-30

## 2024-10-30 RX ORDER — INSULIN ASPART 100 [IU]/ML
INJECTION, SOLUTION INTRAVENOUS; SUBCUTANEOUS
COMMUNITY
End: 2024-10-30

## 2024-10-30 RX ORDER — INSULIN GLARGINE 100 [IU]/ML
24 INJECTION, SOLUTION SUBCUTANEOUS DAILY
Qty: 15 ML | Refills: 6 | Status: SHIPPED | OUTPATIENT
Start: 2024-10-30

## 2024-10-31 ENCOUNTER — TELEPHONE (OUTPATIENT)
Dept: DIABETES | Facility: CLINIC | Age: 69
End: 2024-10-31
Payer: MEDICARE

## 2024-11-05 ENCOUNTER — OFFICE VISIT (OUTPATIENT)
Dept: OTOLARYNGOLOGY | Facility: CLINIC | Age: 69
End: 2024-11-05
Payer: MEDICARE

## 2024-11-05 DIAGNOSIS — H93.A2 PULSATILE TINNITUS OF LEFT EAR: Primary | ICD-10-CM

## 2024-11-05 DIAGNOSIS — Z96.22 PATENT PRESSURE EQUALIZATION (PE) TUBE ON RIGHT SIDE: ICD-10-CM

## 2024-11-05 PROCEDURE — 99999 PR PBB SHADOW E&M-EST. PATIENT-LVL II: CPT | Mod: PBBFAC,,, | Performed by: NURSE PRACTITIONER

## 2024-11-05 PROCEDURE — 1126F AMNT PAIN NOTED NONE PRSNT: CPT | Mod: CPTII,S$GLB,, | Performed by: NURSE PRACTITIONER

## 2024-11-05 PROCEDURE — 3061F NEG MICROALBUMINURIA REV: CPT | Mod: CPTII,S$GLB,, | Performed by: NURSE PRACTITIONER

## 2024-11-05 PROCEDURE — 99214 OFFICE O/P EST MOD 30 MIN: CPT | Mod: S$GLB,,, | Performed by: NURSE PRACTITIONER

## 2024-11-05 PROCEDURE — 3288F FALL RISK ASSESSMENT DOCD: CPT | Mod: CPTII,S$GLB,, | Performed by: NURSE PRACTITIONER

## 2024-11-05 PROCEDURE — 1101F PT FALLS ASSESS-DOCD LE1/YR: CPT | Mod: CPTII,S$GLB,, | Performed by: NURSE PRACTITIONER

## 2024-11-05 PROCEDURE — 3066F NEPHROPATHY DOC TX: CPT | Mod: CPTII,S$GLB,, | Performed by: NURSE PRACTITIONER

## 2024-11-05 PROCEDURE — 3051F HG A1C>EQUAL 7.0%<8.0%: CPT | Mod: CPTII,S$GLB,, | Performed by: NURSE PRACTITIONER

## 2024-11-05 PROCEDURE — 1159F MED LIST DOCD IN RCRD: CPT | Mod: CPTII,S$GLB,, | Performed by: NURSE PRACTITIONER

## 2024-11-05 NOTE — PROGRESS NOTES
"Subjective:   Catalina is a 69 y.o. female who presents for follow-up to review her recent CT Temporal bone. She reports continued pulsatile tinnitus in the left ear. Seems to be louder at night. No right ear issues.    Note from previous visit on 8/21/2024:  Catalina Mcgarry is a 69 y.o. female who was self-referred. She has a past medical history of Diabetes mellitus, Diabetes mellitus, type 2, and Headache(784.0). She reports about 1 year of a "thumping" sound in her left ear. She notes that it sounds like her heartbeat and is constant. It will fluctuate in severity, but has not noticed a pattern. She states she had the same problem in her right ear and Dr. Leiva recently placed a PE tube which improved the sound. She intended to have a tube placed in the left ear but her insurance would not cover this. She denies any headaches, burred vision or recent weight gain. She denies any otalgia, otorrhea, ear fullness/pressure,or vertigo associated with the pulsation sound. Feels her hearing is normal and equal bilaterally. There is not a family history of hearing loss at a young age. There is a prior history of ear surgery-PE tube AD. There is not a prior history of ear infections. There is not a history of ear trauma. She denies a history of significant noise exposure.     The patient's medications, allergies, past medical, surgical, social and family histories were reviewed and updated as appropriate.    A detailed review of systems was obtained with pertinent positives as per the above HPI, and otherwise negative.   Objective:     Constitutional:   She is oriented to person, place, and time. She appears well-developed and well-nourished. She appears alert. She is cooperative.  Non-toxic appearance. She does not have a sickly appearance. She does not appear ill. Normal speech.      Head:  Normocephalic and atraumatic. Not macrocephalic and not microcephalic. Head is without abrasion, without right periorbital erythema, " without left periorbital erythema and without TMJ tenderness.     Ears:    Right Ear: No drainage, swelling or tenderness. No mastoid tenderness. Tympanic membrane is not scarred, not perforated, not erythematous, not retracted and not bulging. No middle ear effusion. A PE tube is seen.   Left Ear: No drainage, swelling or tenderness. No mastoid tenderness. Tympanic membrane is not scarred, not perforated, not erythematous, not retracted and not bulging.  No middle ear effusion.     Pulmonary/Chest:   Effort normal.     Psychiatric:   She has a normal mood and affect. Her speech is normal and behavior is normal.     Neurological:   She is alert and oriented to person, place, and time.     Procedure  None    Audiogram    Imaging  I independently reviewed the CT Temporal Bone dated 10/10/2024.  Pertinent findings:     Assessment:     1. Pulsatile tinnitus of left ear    2. Patent pressure equalization (PE) tube on right side      Plan:     Pulsatile tinnitus of left ear  CT scan reviewed with Dr. Damian, and patient is potentially a candidate for PE tube placement. Discussed scan and further options with patient, she is agreeable with PE tube evaluation with . Referral placed.     Patent pressure equalization (PE) tube on right side

## 2024-11-08 ENCOUNTER — TELEPHONE (OUTPATIENT)
Dept: DIABETES | Facility: CLINIC | Age: 69
End: 2024-11-08
Payer: MEDICARE

## 2024-11-12 ENCOUNTER — OFFICE VISIT (OUTPATIENT)
Dept: OTOLARYNGOLOGY | Facility: CLINIC | Age: 69
End: 2024-11-12
Payer: MEDICARE

## 2024-11-12 DIAGNOSIS — Z96.22 PATENT PRESSURE EQUALIZATION (PE) TUBE ON RIGHT SIDE: ICD-10-CM

## 2024-11-12 DIAGNOSIS — H93.A2 PULSATILE TINNITUS OF LEFT EAR: Primary | ICD-10-CM

## 2024-11-12 PROCEDURE — 99999 PR PBB SHADOW E&M-EST. PATIENT-LVL II: CPT | Mod: PBBFAC,,, | Performed by: OTOLARYNGOLOGY

## 2024-11-12 PROCEDURE — 1159F MED LIST DOCD IN RCRD: CPT | Mod: CPTII,S$GLB,, | Performed by: OTOLARYNGOLOGY

## 2024-11-12 PROCEDURE — 3066F NEPHROPATHY DOC TX: CPT | Mod: CPTII,S$GLB,, | Performed by: OTOLARYNGOLOGY

## 2024-11-12 PROCEDURE — 1126F AMNT PAIN NOTED NONE PRSNT: CPT | Mod: CPTII,S$GLB,, | Performed by: OTOLARYNGOLOGY

## 2024-11-12 PROCEDURE — 69433 CREATE EARDRUM OPENING: CPT | Mod: LT,S$GLB,, | Performed by: OTOLARYNGOLOGY

## 2024-11-12 PROCEDURE — 99214 OFFICE O/P EST MOD 30 MIN: CPT | Mod: 25,S$GLB,, | Performed by: OTOLARYNGOLOGY

## 2024-11-12 PROCEDURE — 1101F PT FALLS ASSESS-DOCD LE1/YR: CPT | Mod: CPTII,S$GLB,, | Performed by: OTOLARYNGOLOGY

## 2024-11-12 PROCEDURE — 3288F FALL RISK ASSESSMENT DOCD: CPT | Mod: CPTII,S$GLB,, | Performed by: OTOLARYNGOLOGY

## 2024-11-12 PROCEDURE — 3051F HG A1C>EQUAL 7.0%<8.0%: CPT | Mod: CPTII,S$GLB,, | Performed by: OTOLARYNGOLOGY

## 2024-11-12 PROCEDURE — 3061F NEG MICROALBUMINURIA REV: CPT | Mod: CPTII,S$GLB,, | Performed by: OTOLARYNGOLOGY

## 2024-11-12 NOTE — PROGRESS NOTES
Subjective     Patient ID: Catalina Mcgarry is a 69 y.o. female.    Chief Complaint: Ear Fullness    Ear Fullness   Pertinent negatives include no sore throat.        Catalina Mcgarry is a 69 y.o. female presents for pulsatile tinnitus of left ear.  She describes as a thumping sound is continuous in his her 24 hours a day.  Initially started in her right ear and was relieved by PE tube placement.  It was then started in her left ear.  She has had a CT scan.  She denies any otalgia, otorrhea, history of chronic ear problems.    Review of Systems   Constitutional:  Negative for chills and fever.   HENT:  Positive for tinnitus. Negative for sore throat and trouble swallowing.    Respiratory:  Negative for apnea and chest tightness.    Cardiovascular:  Negative for chest pain.          Objective     Physical Exam  Vitals and nursing note reviewed.   Constitutional:       Appearance: Normal appearance.   HENT:      Head: Normocephalic and atraumatic.      Right Ear: Tympanic membrane, ear canal and external ear normal. There is no impacted cerumen. A PE tube is present.      Left Ear: Tympanic membrane, ear canal and external ear normal. There is no impacted cerumen.   Neurological:      Mental Status: She is alert.       Data Reviewed:      Audiogram tracings independently reviewed and discussed with patient shows bilateral mild hearing loss at 250 hertz appears to be conductive in nature otherwise normal hearing right type B TYMP with large volume consistent with PE tube.  Left side with type C tympanogram    CT temporal bone reviewed by me of note left mastoid with partial opacification     Procedure:  Left myringotomy with PE tube placement   Details: After informed consent regarding infection, perforation, early extrusion and possible cholesteatoma formation the patient was brought to the minor procedure room and placed in the supine position. The operating microscope was then brought onto the field and the tympanic  membrane was visualized. Using a sterile, single use phenol kit the TM was sterilized and anesthetized. A myringotomy incision was made middle ear was dry . A sterile Rondon V tympanostomy tube was placed and the procedure was terminated. The patient tolerated the procedure well.      Water precautions discussed. RTC as directed.       Assessment and Plan     1. Pulsatile tinnitus of left ear    2. Patent pressure equalization (PE) tube on right side        Summary is a 69-year-old with left pulsatile tinnitus.  Objective findings include type C tympanogram and partial opacification of mastoid on CT scan.  Patient had relieved with PE tube on the right side therefore we discussed option of placement on the left side.  Patient wished to proceed with this.      PE tube placed, middle ear was dry   Follow up in 1 month with audiogram         No follow-ups on file.

## 2024-11-13 ENCOUNTER — PATIENT MESSAGE (OUTPATIENT)
Dept: DIABETES | Facility: CLINIC | Age: 69
End: 2024-11-13
Payer: MEDICARE

## 2024-11-13 DIAGNOSIS — E10.65 TYPE 1 DIABETES MELLITUS WITH HYPERGLYCEMIA: ICD-10-CM

## 2024-11-13 RX ORDER — INSULIN ASPART INJECTION 100 [IU]/ML
INJECTION, SOLUTION SUBCUTANEOUS
Qty: 30 ML | Refills: 11 | Status: SHIPPED | OUTPATIENT
Start: 2024-11-13

## 2024-11-13 NOTE — TELEPHONE ENCOUNTER
----- Message from Birgit sent at 11/13/2024 12:54 PM CST -----  Contact: patient  [phone]  .[email or web address]     Patient is calling for Medical Advice regarding:Patient state she has not been able to get her insulin aspart, niacinamide, (FIASP U-100 INSULIN) 100 unit/mL Soln .   [id number] - [address] AT [address] [address]  Phone: [phone] Fax: [phone]    Does not have it. Patient has not had Rx in a month. Patient want us sent to other Rx    How long has patient had these symptoms:    Pharmacy name and phone#:[facility]    Patient wants a call back or thru [facility]:call     Comments:    Please advise patient replies from provider may take up to 48 hours.

## 2024-11-13 NOTE — TELEPHONE ENCOUNTER
----- Message from Birgit sent at 11/13/2024 12:54 PM CST -----  Contact: patient  [phone]  .[email or web address]     Patient is calling for Medical Advice regarding:Patient state she has not been able to get her insulin aspart, niacinamide, (FIASP U-100 INSULIN) 100 unit/mL Soln .   [facility] #[id number] - [address] AT [address] [address]  Phone: [phone] Fax: [phone]    Does not have it. Patient has not had Rx in a month. Patient want us sent to other Rx    How long has patient had these symptoms:    Pharmacy name and phone#:[facility]    Patient wants a call back or thru [facility]:call     Comments:    Please advise patient replies from provider may take up to 48 hours.

## 2024-12-03 ENCOUNTER — OFFICE VISIT (OUTPATIENT)
Dept: PODIATRY | Facility: CLINIC | Age: 69
End: 2024-12-03
Payer: MEDICARE

## 2024-12-03 VITALS
HEART RATE: 77 BPM | WEIGHT: 142.19 LBS | BODY MASS INDEX: 25.2 KG/M2 | HEIGHT: 63 IN | SYSTOLIC BLOOD PRESSURE: 113 MMHG | DIASTOLIC BLOOD PRESSURE: 68 MMHG

## 2024-12-03 DIAGNOSIS — E10.8 DIABETES MELLITUS TYPE 1, WITH COMPLICATION, ON LONG TERM INSULIN PUMP: ICD-10-CM

## 2024-12-03 DIAGNOSIS — B36.9 SUPERFICIAL MYCOSIS: Primary | ICD-10-CM

## 2024-12-03 DIAGNOSIS — M20.31 HALLUX MALLEUS OF RIGHT FOOT: ICD-10-CM

## 2024-12-03 DIAGNOSIS — Z96.41 DIABETES MELLITUS TYPE 1, WITH COMPLICATION, ON LONG TERM INSULIN PUMP: ICD-10-CM

## 2024-12-03 DIAGNOSIS — R22.41 LOCALIZED SWELLING, MASS AND LUMP, RIGHT LOWER LIMB: ICD-10-CM

## 2024-12-03 PROCEDURE — 1126F AMNT PAIN NOTED NONE PRSNT: CPT | Mod: CPTII,S$GLB,, | Performed by: PODIATRIST

## 2024-12-03 PROCEDURE — 3008F BODY MASS INDEX DOCD: CPT | Mod: CPTII,S$GLB,, | Performed by: PODIATRIST

## 2024-12-03 PROCEDURE — 99999 PR PBB SHADOW E&M-EST. PATIENT-LVL IV: CPT | Mod: PBBFAC,,, | Performed by: PODIATRIST

## 2024-12-03 PROCEDURE — 3051F HG A1C>EQUAL 7.0%<8.0%: CPT | Mod: CPTII,S$GLB,, | Performed by: PODIATRIST

## 2024-12-03 PROCEDURE — 99203 OFFICE O/P NEW LOW 30 MIN: CPT | Mod: S$GLB,,, | Performed by: PODIATRIST

## 2024-12-03 PROCEDURE — 3074F SYST BP LT 130 MM HG: CPT | Mod: CPTII,S$GLB,, | Performed by: PODIATRIST

## 2024-12-03 PROCEDURE — 3061F NEG MICROALBUMINURIA REV: CPT | Mod: CPTII,S$GLB,, | Performed by: PODIATRIST

## 2024-12-03 PROCEDURE — 3288F FALL RISK ASSESSMENT DOCD: CPT | Mod: CPTII,S$GLB,, | Performed by: PODIATRIST

## 2024-12-03 PROCEDURE — 3078F DIAST BP <80 MM HG: CPT | Mod: CPTII,S$GLB,, | Performed by: PODIATRIST

## 2024-12-03 PROCEDURE — 3066F NEPHROPATHY DOC TX: CPT | Mod: CPTII,S$GLB,, | Performed by: PODIATRIST

## 2024-12-03 PROCEDURE — 1101F PT FALLS ASSESS-DOCD LE1/YR: CPT | Mod: CPTII,S$GLB,, | Performed by: PODIATRIST

## 2024-12-03 RX ORDER — CICLOPIROX 80 MG/ML
SOLUTION TOPICAL NIGHTLY
Qty: 6.6 ML | Refills: 2 | Status: SHIPPED | OUTPATIENT
Start: 2024-12-03

## 2024-12-03 NOTE — PROGRESS NOTES
Subjective:      Patient ID: Catalina Mcgarry is a 69 y.o. female.    Chief Complaint: Nail Problem (Toenail fungus)    Catalina is a 69 y.o. female who presents new to the clinic c/o thick & discolored toenails on both feet. Catalina is inquiring about tx options. Been spraying w/ alcohol & using a cream Dr.Gabrielle Cole that has clove, lavender, clove that keeps feet oiled. A year stumped toe & has a knot R great toe - no pain but concernes as disbetic.     PCP Pamela Turner, NP 2/28/24, due 12/16/24  DM mgmt.: Alisa Emerson, ADIN 10/30/24    Dx DM 45 yrs.  Past Medical History:   Diagnosis Date    Diabetes mellitus     Diabetes mellitus, type 2     Headache(784.0)      Patient Active Problem List   Diagnosis    Proliferative diabetic retinopathy, both eyes    Vitreous hemorrhage, right eye    Posterior vitreous detachment, left eye    Diabetic macular edema, both eyes    Type 1 diabetes mellitus with hyperglycemia    Diabetes mellitus type 1, with complication, on long term insulin pump    Tobacco dependency    Chronic obstructive pulmonary disease, unspecified COPD type    Insulin pump in place    Insulin pump fitting or adjustment    Dyslipidemia, goal LDL below 100     Changed insulin to Farxiga fast acting last appt.  Hemoglobin A1C   Date Value Ref Range Status   10/30/2024 7.6 (H) 4.0 - 5.6 % Final     Comment:     ADA Screening Guidelines:  5.7-6.4%  Consistent with prediabetes  >or=6.5%  Consistent with diabetes    High levels of fetal hemoglobin interfere with the HbA1C  assay. Heterozygous hemoglobin variants (HbS, HgC, etc)do  not significantly interfere with this assay.   However, presence of multiple variants may affect accuracy.     08/26/2024 7.6 (H) 4.0 - 5.6 % Final     Comment:     ADA Screening Guidelines:  5.7-6.4%  Consistent with prediabetes  >or=6.5%  Consistent with diabetes    High levels of fetal hemoglobin interfere with the HbA1C  assay. Heterozygous hemoglobin variants (HbS, HgC,  etc)do  not significantly interfere with this assay.   However, presence of multiple variants may affect accuracy.     09/20/2023 7.8 (H) 4.0 - 5.6 % Final     Comment:     ADA Screening Guidelines:  5.7-6.4%  Consistent with prediabetes  >or=6.5%  Consistent with diabetes    High levels of fetal hemoglobin interfere with the HbA1C  assay. Heterozygous hemoglobin variants (HbS, HgC, etc)do  not significantly interfere with this assay.   However, presence of multiple variants may affect accuracy.        Objective:      ROS  Physical Exam  Vitals reviewed.   Constitutional:       Appearance: She is well-developed and normal weight.   Cardiovascular:      Pulses: Normal pulses.           Dorsalis pedis pulses are 2+ on the right side.   Musculoskeletal:         General: Tenderness present. No swelling or signs of injury.      Right lower leg: No edema.      Left lower leg: No edema.      Right foot: Deformity: hallux malleus R & adductovarus 4th.      Left foot: Deformity present.   Feet:      Comments: Equinus B/L ankles w/ < 90 deg foot to leg noted w/ knees extended.      MS strength of extrinsics to foot & ankle B/L + 5/5 in DF/PF/Inv/Ev to resistance w/ no reproduction of pain in any direction.     Passive ROM of ankle & pedal joints is painless & w/out crepitation B/L.     Skin:     General: Skin is warm and dry.      Capillary Refill: Capillary refill takes less than 2 seconds.      Findings: No bruising, erythema or signs of injury.      Comments: Prominence dorsal medial 1st IPJ   Neurological:      Mental Status: She is alert and oriented to person, place, and time.      Sensory: Sensation is intact.      Motor: No weakness or abnormal muscle tone.      Gait: Gait is intact. Gait normal.   Psychiatric:         Mood and Affect: Mood and affect normal.         Behavior: Behavior normal. Behavior is cooperative.         Assessment:      Encounter Diagnoses   Name Primary?    Superficial mycosis Yes    Localized  swelling, mass and lump, right lower limb     Hallux malleus of right foot     Diabetes mellitus type 1, with complication, on long term insulin pump        Problem List Items Addressed This Visit          Endocrine    Diabetes mellitus type 1, with complication, on long term insulin pump     Other Visit Diagnoses       Superficial mycosis    -  Primary    Relevant Medications    ciclopirox (PENLAC) 8 % Soln    Localized swelling, mass and lump, right lower limb        Hallux malleus of right foot              Plan:       Catalina was seen today for nail problem.    Diagnoses and all orders for this visit:    Superficial mycosis  -     ciclopirox (PENLAC) 8 % Soln; Apply topically nightly.    Localized swelling, mass and lump, right lower limb    Hallux malleus of right foot    Diabetes mellitus type 1, with complication, on long term insulin pump    I counseled the patient on her conditions, their implications & medical met.    - Shoe inspection. Patient instructed on proper foot hygeine. We discussed wearing proper shoe gear, daily foot inspections, never walking w/out protective shoe gear, never putting sharp instruments to feet, annual or semi-annual DM foot exam & tx prn, sooner prn.    Instructions provided on OTC topical antifungal tx options.  Rx Penlac written.        A total of 28 mins.was spent on chart review, patient visit & documentation.

## 2024-12-04 DIAGNOSIS — M79.641 PAIN IN BOTH HANDS: ICD-10-CM

## 2024-12-04 DIAGNOSIS — M25.531 PAIN IN BOTH WRISTS: Primary | ICD-10-CM

## 2024-12-04 DIAGNOSIS — M79.642 PAIN IN BOTH HANDS: ICD-10-CM

## 2024-12-04 DIAGNOSIS — M25.532 PAIN IN BOTH WRISTS: Primary | ICD-10-CM

## 2024-12-11 ENCOUNTER — PATIENT MESSAGE (OUTPATIENT)
Dept: NEUROLOGY | Facility: CLINIC | Age: 69
End: 2024-12-11
Payer: MEDICARE

## 2024-12-11 ENCOUNTER — OFFICE VISIT (OUTPATIENT)
Dept: ORTHOPEDICS | Facility: CLINIC | Age: 69
End: 2024-12-11
Payer: MEDICARE

## 2024-12-11 ENCOUNTER — TELEPHONE (OUTPATIENT)
Dept: NEUROLOGY | Facility: CLINIC | Age: 69
End: 2024-12-11
Payer: MEDICARE

## 2024-12-11 VITALS
WEIGHT: 141.31 LBS | DIASTOLIC BLOOD PRESSURE: 67 MMHG | SYSTOLIC BLOOD PRESSURE: 145 MMHG | BODY MASS INDEX: 25.03 KG/M2 | HEART RATE: 95 BPM

## 2024-12-11 DIAGNOSIS — G56.03 BILATERAL CARPAL TUNNEL SYNDROME: Primary | ICD-10-CM

## 2024-12-11 PROCEDURE — 99204 OFFICE O/P NEW MOD 45 MIN: CPT | Mod: S$GLB,,, | Performed by: ORTHOPAEDIC SURGERY

## 2024-12-11 PROCEDURE — 99999 PR PBB SHADOW E&M-EST. PATIENT-LVL III: CPT | Mod: PBBFAC,,, | Performed by: ORTHOPAEDIC SURGERY

## 2024-12-11 PROCEDURE — 1126F AMNT PAIN NOTED NONE PRSNT: CPT | Mod: CPTII,S$GLB,, | Performed by: ORTHOPAEDIC SURGERY

## 2024-12-11 PROCEDURE — 1159F MED LIST DOCD IN RCRD: CPT | Mod: CPTII,S$GLB,, | Performed by: ORTHOPAEDIC SURGERY

## 2024-12-11 PROCEDURE — 3066F NEPHROPATHY DOC TX: CPT | Mod: CPTII,S$GLB,, | Performed by: ORTHOPAEDIC SURGERY

## 2024-12-11 PROCEDURE — 3051F HG A1C>EQUAL 7.0%<8.0%: CPT | Mod: CPTII,S$GLB,, | Performed by: ORTHOPAEDIC SURGERY

## 2024-12-11 PROCEDURE — 3061F NEG MICROALBUMINURIA REV: CPT | Mod: CPTII,S$GLB,, | Performed by: ORTHOPAEDIC SURGERY

## 2024-12-11 PROCEDURE — 3008F BODY MASS INDEX DOCD: CPT | Mod: CPTII,S$GLB,, | Performed by: ORTHOPAEDIC SURGERY

## 2024-12-11 PROCEDURE — 3077F SYST BP >= 140 MM HG: CPT | Mod: CPTII,S$GLB,, | Performed by: ORTHOPAEDIC SURGERY

## 2024-12-11 PROCEDURE — 3078F DIAST BP <80 MM HG: CPT | Mod: CPTII,S$GLB,, | Performed by: ORTHOPAEDIC SURGERY

## 2024-12-11 NOTE — TELEPHONE ENCOUNTER
Staff called patient and left a voice message so she can contact the clinic back. Staff also sent a portal message.

## 2024-12-11 NOTE — PROGRESS NOTES
Patient ID: Catalina Mcgarry is a 69 y.o. female    Chief Complaint:   Chief Complaint   Patient presents with    Left Wrist - Pain    Right Wrist - Pain       History of Present Illness:    Pleasant 69-year-old female with diabetes last A1c 7.6 here for evaluation of bilateral hand pain.  She reports about a 1 year history of bilateral hand pain.  Both are painful and mostly involve the wrist palm and median 3 digits.  She does have burning pain in the ulnar aspect of the hand as well as the small finger bilaterally.  Denies any elbow pain.  She was seen by [facility] in the past.  She has had what sounds to be carpal tunnel injections x1 bilaterally which did improve her symptoms temporarily.    PAST MEDICAL HISTORY:   Past Medical History:   Diagnosis Date    Diabetes mellitus     Diabetes mellitus, type 2     Headache(784.0)      PAST SURGICAL HISTORY:   Past Surgical History:   Procedure Laterality Date    BREAST BIOPSY      GUM SURGERY       FAMILY HISTORY:   Family History   Problem Relation Name Age of Onset    Diabetes Maternal Aunt      Diabetes Son      Breast cancer Maternal Grandmother      Migraines Neg Hx       SOCIAL HISTORY:   Social History     Occupational History    Occupation: dispatcher     Employer: [employer]     Comment: Dispatcher   Tobacco Use    Smoking status: Some Days     Current packs/day: 0.00     Types: Cigarettes     Last attempt to quit: 7/14/2014     Years since quitting: 10.4    Smokeless tobacco: Never   Substance and Sexual Activity    Alcohol use: No    Drug use: No    Sexual activity: Not Currently        MEDICATIONS:   Current Outpatient Medications:     ascorbic acid (VITAMIN C) 500 MG tablet, Take 500 mg by mouth once daily., Disp: , Rfl:     blood sugar diagnostic Strp, 1 strip by Misc.(Non-Drug; Combo Route) route 3 (three) times daily before meals., Disp: 200 strip, Rfl: 3    blood-glucose sensor (DEXCOM G6 SENSOR) Eileen, 1 each by Misc.(Non-Drug; Combo Route)  "route every 10 days. Apply/change sensor to skin every 10 days., Disp: 10 each, Rfl: 3    blood-glucose transmitter (DEXCOM G6 TRANSMITTER) Eileen, 1 each by Misc.(Non-Drug; Combo Route) route Daily. Use transmitter in sensor with G6 system. Change new transmitter every 3 months., Disp: 1 each, Rfl: 3    ciclopirox (PENLAC) 8 % Soln, Apply topically nightly., Disp: 6.6 mL, Rfl: 2    fluticasone propionate (FLONASE) 50 mcg/actuation nasal spray, 1 spray by Each Nostril route once daily., Disp: , Rfl:     glucagon (BAQSIMI) 3 mg/actuation Spry, 1 each by Nasal route daily as needed (IF GLUCOSE IS LESS THAN 70)., Disp: 2 each, Rfl: 1    insulin aspart U-100 (NOVOLOG FLEXPEN U-100 INSULIN) 100 unit/mL (3 mL) InPn pen, To have in case of pump failure. ICR 1:9 TID AC + correction. MAX TDD of 50 units, Disp: 15 mL, Rfl: 6    insulin aspart, niacinamide, (FIASP U-100 INSULIN) 100 unit/mL Soln, To use continuously with Omnipod 5. MAX total daily dose: 100 units., Disp: 30 mL, Rfl: 11    insulin glargine U-100, Lantus, (LANTUS SOLOSTAR U-100 INSULIN) 100 unit/mL (3 mL) InPn pen, Inject 24 Units into the skin once daily. IN AN EMERGENCY, USE THIS DAILY IF YOU ARE OFF OF YOUR INSULIN PUMP (Patient not taking: Reported on 12/3/2024), Disp: 15 mL, Rfl: 6    insulin pump cart,auto,BT,G6/7 (OMNIPOD 5 G6-G7 PODS, GEN 5,) Crtg, Inject 1 Device into the skin every 72 hours., Disp: 6 each, Rfl: 3    lancets (ACTI-JUANCHO LANCETS) 28 gauge Misc, 1 lancet  by Misc.(Non-Drug; Combo Route) route 3 (three) times daily before meals., Disp: 200 each, Rfl: 3    pen needle, diabetic 32 gauge x 5/32" Ndle, Inject 1 each into the skin 4 (four) times daily as needed (IF INSULIN PENS IF OFF OF INSULIN PUMP)., Disp: 90 each, Rfl: 1    rosuvastatin (CRESTOR) 10 MG tablet, Take 1 tablet (10 mg total) by mouth every evening., Disp: 90 tablet, Rfl: 2  ALLERGIES: Review of patient's allergies indicates:  No Known Allergies      Physical Exam     Vitals:    " 12/11/24 0818   BP: (!) 145/67   Pulse: 95     Alert and oriented to person, place and time. No acute distress. Well-groomed, not ill appearing. Pupils round and reactive, normal respiratory effort, no audible wheezing.     On exam she has no significant atrophy.  She has mild swelling of the hands.  She has negative Tinel's at the wrist.  Positive Phalen's and Durkan's.  Positive carpal tunnel compression test bilaterally.  Negative Tinel's at the elbow.  Full range of motion of the elbow.  Negative Spurling's.        Imaging:       X-Ray: I have reviewed all pertinent results/findings and my personal findings are:  Negative bilateral wrist radiographs      Assessment & Plan    Bilateral carpal tunnel syndrome  -     EMG W/ ULTRASOUND AND NERVE CONDUCTION TEST 2 Extremities; Future         Treatment options were discussed with her in detail.  Clinically this does seem to be carpal tunnel syndrome and possibly ulnar nerve compression at the elbow.  We discussed night splints as well as anti-inflammatories.  She has already had a carpal tunnel injection bilaterally with some improvement which does make me think that this is carpal tunnel.  We discussed what this means for long term.  We would recommend EMG for confirmation and discussion of treatment options.  Follow up after EMG

## 2025-01-06 ENCOUNTER — TELEPHONE (OUTPATIENT)
Dept: OTOLARYNGOLOGY | Facility: CLINIC | Age: 70
End: 2025-01-06
Payer: MEDICARE

## 2025-01-09 DIAGNOSIS — E10.65 TYPE 1 DIABETES MELLITUS WITH HYPERGLYCEMIA: ICD-10-CM

## 2025-01-09 RX ORDER — INSULIN PMP CART,AUT,G6/7,CNTR
1 EACH SUBCUTANEOUS
Qty: 30 EACH | Refills: 3 | Status: SHIPPED | OUTPATIENT
Start: 2025-01-09

## 2025-01-10 DIAGNOSIS — B36.9 SUPERFICIAL MYCOSIS: ICD-10-CM

## 2025-01-13 RX ORDER — CICLOPIROX 80 MG/ML
SOLUTION TOPICAL NIGHTLY
Qty: 6.6 ML | Refills: 2 | Status: SHIPPED | OUTPATIENT
Start: 2025-01-13

## 2025-01-13 RX ORDER — CICLOPIROX 80 MG/ML
SOLUTION TOPICAL NIGHTLY
Qty: 6.6 ML | Refills: 2 | OUTPATIENT
Start: 2025-01-13

## 2025-01-27 ENCOUNTER — PATIENT MESSAGE (OUTPATIENT)
Dept: OTOLARYNGOLOGY | Facility: CLINIC | Age: 70
End: 2025-01-27
Payer: MEDICARE

## 2025-02-17 ENCOUNTER — PROCEDURE VISIT (OUTPATIENT)
Dept: NEUROLOGY | Facility: CLINIC | Age: 70
End: 2025-02-17
Payer: MEDICARE

## 2025-02-17 DIAGNOSIS — G56.03 BILATERAL CARPAL TUNNEL SYNDROME: ICD-10-CM

## 2025-02-17 NOTE — PROCEDURES
Test Date:  2025    Patient: caryl mcgarry : 1955 Physician: Bryan Fu D.O.   ID#: [id number] SEX: Female Ref. Phys: Akira Valdivia MD     HPI: Caryl Mcgarry is a 69 y.o.female who presents for NCS/EMG to evaluate for CTS bilaterally.      PROCEDURE:  Prior to the procedure, the procedure was discussed in detail with the patient.  All questions were answered, and verbal consent was obtained.  For nerve conduction studies, a combination of surface electrodes, bar electrodes, and/or ring electrodes were used as needed.  For needle EMG, each site was cleaned and prepped in usual fashion with an alcohol pad.  A monopolar needle (28G) was used.  There was no significant bleeding, and bandages were applied as needed.  The procedure was tolerated without adverse effect.  The patient was instructed on post-procedure care including ice if needed for 10-15 minutes up to 4 times/day for any sore muscles.  I discussed with the patient that the data would be reviewed and a report sent to the referring provider, where any follow up questions regarding next steps should be directed.        NCV & EMG Findings:  Evaluation of the left median motor nerve showed prolonged distal onset latency.  The right median motor nerve showed prolonged distal onset latency and decreased conduction velocity.  The left median sensory nerve showed prolonged distal onset latency, prolonged distal peak latency, and decreased conduction velocity.  The right median sensory nerve showed no response.  All remaining nerves (as indicated in the following tables) were within normal limits.  Needle evaluation of the right Abductor Pollicis Brevis and the left Abductor Pollicis Brevis muscles showed increased motor unit amplitude.  All remaining muscles (as indicated in the following table) showed no evidence of electrical instability.      IMPRESSIONS:  There is electrophysiologic evidence of chronic bilateral sensorimotor median  mononeuropathy across the wrist (I.e. Carpal tunnel syndrome).  There is no motor axonal loss.  There is no active denervation.  This is graded as Severe in severity on the right, and Moderate on the left.          ___________________________  Bryan Fu D.O.        NCS+  Motor Nerve Results      Latency Amplitude F-Lat Segment Distance CV Comment   Site (ms) Norm (mV) Norm (ms)  (cm) (m/s) Norm    Left Median (APB)   Wrist *4.8  < 4.4 6.5  > 3.8         Elbow 9.2 - 5.7 -  Elbow-Wrist 24 55  > 51    Right Median (APB)   Wrist *6.0  < 4.4 5.1  > 3.8         Elbow 10.7 - 4.1 -  Elbow-Wrist 20 *43  > 51    Left Ulnar (ADM)   Wrist 2.8  < 3.7 4.9  > 3.0         Bel Elbow 6.6 - 4.2 -  Bel Elbow-Wrist 24 63  > 52    Abv Elbow 8.2 - 4.1 -  Abv Elbow-Bel Elbow 10 63  > 43    Right Ulnar (ADM)   Wrist 2.6  < 3.7 5.8  > 3.0         Bel Elbow 6.6 - 6.0 -  Bel Elbow-Wrist 23 58  > 52    Abv Elbow 8.4 - 5.9 -  Abv Elbow-Bel Elbow 10 56  > 43      Sensory Nerve Results      Start Lat Latency (Peak) Amplitude (P-P) Segment Distance Start CV Comment   Site (ms) Norm (ms) (µV) Norm  (cm) (m/s) Norm    Left Median (Rec:Dig II)   Wrist *4.5  < 3.3 *5.6 9  > 8 Wrist-Dig II 14 *31  > 42    Right Median (Rec:Dig II)   Wrist *NR  < 3.3 *NR *NR  > 8 Wrist-Dig II 14 *NR  > 42    Left Ulnar (Rec:Dig V)   Wrist 2.6  < 3.1 3.2 12  > 4 Wrist-Dig V 14 54  > 45    Right Ulnar (Rec:Dig V)   Wrist 2.3  < 3.1 3.0 5  > 4 Wrist-Dig V 14 61  > 45    Left Radial (Rec:Wrist)   Forearm 1.23  < 2.2 2.3 11  > 11 Forearm-Wrist 10 81 -    Right Radial (Rec:Wrist)   Forearm 1.43  < 2.2 2.0 12  > 11 Forearm-Wrist 10 70 -      EMG+     Side Muscle Nerve Root Ins Act Fibs Psw Amp Dur Poly Recrt Int Pat Comment   Right Deltoid Axillary C5-C6 Nml Nml Nml Nml Nml 0 Nml Nml    Right Biceps Musculocut C5-C6 Nml Nml Nml Nml Nml 0 Nml Nml    Right Triceps Radial C6-C8 Nml Nml Nml Nml Nml 0 Nml Nml    Right Pronator Teres Median C6-C7 Nml Nml Nml Nml Nml 0 Nml  Nml    Right APB Median C8-T1 Nml Nml Nml *Incr Nml 0 Nml Nml    Left Deltoid Axillary C5-C6 Nml Nml Nml Nml Nml 0 Nml Nml    Left Biceps Musculocut C5-C6 Nml Nml Nml Nml Nml 0 Nml Nml    Left Triceps Radial C6-C8 Nml Nml Nml Nml Nml 0 Nml Nml    Left Pronator Teres Median C6-C7 Nml Nml Nml Nml Nml 0 Nml Nml    Left APB Median C8-T1 Nml Nml Nml *Incr Nml 0 Nml Nml            Waveforms:    Motor              Sensory

## 2025-02-26 ENCOUNTER — OFFICE VISIT (OUTPATIENT)
Dept: ORTHOPEDICS | Facility: CLINIC | Age: 70
End: 2025-02-26
Payer: MEDICARE

## 2025-02-26 VITALS
BODY MASS INDEX: 25.59 KG/M2 | HEIGHT: 63 IN | WEIGHT: 144.44 LBS | SYSTOLIC BLOOD PRESSURE: 110 MMHG | DIASTOLIC BLOOD PRESSURE: 65 MMHG | HEART RATE: 76 BPM

## 2025-02-26 DIAGNOSIS — G56.01 CARPAL TUNNEL SYNDROME ON RIGHT: ICD-10-CM

## 2025-02-26 DIAGNOSIS — M65.321 TRIGGER FINGER, RIGHT INDEX FINGER: Primary | ICD-10-CM

## 2025-02-26 DIAGNOSIS — G56.03 CARPAL TUNNEL SYNDROME, BILATERAL: ICD-10-CM

## 2025-02-26 DIAGNOSIS — G56.01 RIGHT CARPAL TUNNEL SYNDROME: ICD-10-CM

## 2025-02-26 PROCEDURE — 3078F DIAST BP <80 MM HG: CPT | Mod: CPTII,S$GLB,, | Performed by: ORTHOPAEDIC SURGERY

## 2025-02-26 PROCEDURE — 1125F AMNT PAIN NOTED PAIN PRSNT: CPT | Mod: CPTII,S$GLB,, | Performed by: ORTHOPAEDIC SURGERY

## 2025-02-26 PROCEDURE — 1159F MED LIST DOCD IN RCRD: CPT | Mod: CPTII,S$GLB,, | Performed by: ORTHOPAEDIC SURGERY

## 2025-02-26 PROCEDURE — 99999 PR PBB SHADOW E&M-EST. PATIENT-LVL III: CPT | Mod: PBBFAC,,, | Performed by: ORTHOPAEDIC SURGERY

## 2025-02-26 PROCEDURE — 3008F BODY MASS INDEX DOCD: CPT | Mod: CPTII,S$GLB,, | Performed by: ORTHOPAEDIC SURGERY

## 2025-02-26 PROCEDURE — 3074F SYST BP LT 130 MM HG: CPT | Mod: CPTII,S$GLB,, | Performed by: ORTHOPAEDIC SURGERY

## 2025-02-26 PROCEDURE — 99215 OFFICE O/P EST HI 40 MIN: CPT | Mod: S$GLB,,, | Performed by: ORTHOPAEDIC SURGERY

## 2025-02-26 PROCEDURE — 1101F PT FALLS ASSESS-DOCD LE1/YR: CPT | Mod: CPTII,S$GLB,, | Performed by: ORTHOPAEDIC SURGERY

## 2025-02-26 PROCEDURE — 3288F FALL RISK ASSESSMENT DOCD: CPT | Mod: CPTII,S$GLB,, | Performed by: ORTHOPAEDIC SURGERY

## 2025-02-26 PROCEDURE — 3051F HG A1C>EQUAL 7.0%<8.0%: CPT | Mod: CPTII,S$GLB,, | Performed by: ORTHOPAEDIC SURGERY

## 2025-02-26 NOTE — PROGRESS NOTES
Patient ID: Catalina Mcgarry is a 69 y.o. female    Chief Complaint:   Chief Complaint   Patient presents with    Left Hand - Pain    Right Hand - Pain       History of Present Illness:    Pleasant 69-year-old female with diabetes last A1c 7.6 here for evaluation of bilateral hand pain.  She reports about a 1 year history of bilateral hand pain.  Both are painful and mostly involve the wrist palm and median 3 digits.  She does have burning pain in the ulnar aspect of the hand as well as the small finger bilaterally.  Denies any elbow pain.  She was seen by [facility] in the past.  She has had what sounds to be carpal tunnel injections x1 bilaterally which did improve her symptoms temporarily.    ____________________________________________________________________    Interval history 02/26/2025 : Patient returns today for EMG follow-up of their bilateral hands.  They report no changes since I saw them last.  Also reports pain along the index finger    PAST MEDICAL HISTORY:   Past Medical History:   Diagnosis Date    Diabetes mellitus     Diabetes mellitus, type 2     Headache(784.0)      PAST SURGICAL HISTORY:   Past Surgical History:   Procedure Laterality Date    BREAST BIOPSY      GUM SURGERY       FAMILY HISTORY:   Family History   Problem Relation Name Age of Onset    Diabetes Maternal Aunt      Diabetes Son      Breast cancer Maternal Grandmother      Migraines Neg Hx       SOCIAL HISTORY:   Social History     Occupational History    Occupation: dispatcher     Employer: [employer]     Comment: Dispatcher   Tobacco Use    Smoking status: Former     Current packs/day: 0.00     Types: Cigarettes     Quit date: 7/14/2014     Years since quitting: 10.6    Smokeless tobacco: Never   Substance and Sexual Activity    Alcohol use: No    Drug use: No    Sexual activity: Not Currently        MEDICATIONS:   Current Outpatient Medications:     ascorbic acid (VITAMIN C) 500 MG tablet, Take 500 mg by mouth once daily.,  Disp: , Rfl:     insulin aspart, niacinamide, (FIASP U-100 INSULIN) 100 unit/mL Soln, To use continuously with Omnipod 5. MAX total daily dose: 100 units., Disp: 30 mL, Rfl: 11    rosuvastatin (CRESTOR) 10 MG tablet, Take 1 tablet (10 mg total) by mouth every evening., Disp: 90 tablet, Rfl: 2    blood sugar diagnostic Strp, 1 strip by Misc.(Non-Drug; Combo Route) route 3 (three) times daily before meals., Disp: 200 strip, Rfl: 3    blood-glucose sensor (DEXCOM G6 SENSOR) Eileen, 1 each by Misc.(Non-Drug; Combo Route) route every 10 days. Apply/change sensor to skin every 10 days., Disp: 10 each, Rfl: 3    blood-glucose transmitter (DEXCOM G6 TRANSMITTER) Eileen, 1 each by Misc.(Non-Drug; Combo Route) route Daily. Use transmitter in sensor with G6 system. Change new transmitter every 3 months., Disp: 1 each, Rfl: 3    ciclopirox (PENLAC) 8 % Soln, Apply topically nightly as directed, Disp: 6.6 mL, Rfl: 2    fluticasone propionate (FLONASE) 50 mcg/actuation nasal spray, 1 spray by Each Nostril route once daily., Disp: , Rfl:     glucagon (BAQSIMI) 3 mg/actuation Spry, 1 each by Nasal route daily as needed (IF GLUCOSE IS LESS THAN 70)., Disp: 2 each, Rfl: 1    insulin aspart U-100 (NOVOLOG FLEXPEN U-100 INSULIN) 100 unit/mL (3 mL) InPn pen, To have in case of pump failure. ICR 1:9 TID AC + correction. MAX TDD of 50 units (Patient not taking: Reported on 2/26/2025), Disp: 15 mL, Rfl: 6    insulin glargine U-100, Lantus, (LANTUS SOLOSTAR U-100 INSULIN) 100 unit/mL (3 mL) InPn pen, Inject 24 Units into the skin once daily. IN AN EMERGENCY, USE THIS DAILY IF YOU ARE OFF OF YOUR INSULIN PUMP (Patient not taking: Reported on 12/3/2024), Disp: 15 mL, Rfl: 6    insulin pump cart,auto,BT,G6/7 (OMNIPOD 5 G6-G7 PODS, GEN 5,) Crtg, Inject 1 Device into the skin every 72 hours., Disp: 30 each, Rfl: 3    lancets (ACTI-JUANCHO LANCETS) 28 gauge Misc, 1 lancet  by Misc.(Non-Drug; Combo Route) route 3 (three) times daily before meals., Disp:  "200 each, Rfl: 3    pen needle, diabetic 32 gauge x 5/32" Ndle, Inject 1 each into the skin 4 (four) times daily as needed (IF INSULIN PENS IF OFF OF INSULIN PUMP)., Disp: 90 each, Rfl: 1  ALLERGIES: Review of patient's allergies indicates:  No Known Allergies      Physical Exam     Vitals:    02/26/25 0937   BP: 110/65   Pulse: 76     Alert and oriented to person, place and time. No acute distress. Well-groomed, not ill appearing. Pupils round and reactive, normal respiratory effort, no audible wheezing.     On exam she has no significant atrophy.  She has mild swelling of the hands.  She has negative Tinel's at the wrist.  Positive Phalen's and Durkan's.  Positive carpal tunnel compression test bilaterally.  Negative Tinel's at the elbow.  Full range of motion of the elbow.  Negative Spurling's.  Tenderness over the A1 pulley right index finger.  Limited range of motion secondary to pain      Imaging:       X-Ray: I have reviewed all pertinent results/findings and my personal findings are:  Negative bilateral wrist radiographs  EMG reviewed showing severe right carpal tunnel syndrome, moderate left carpal tunnel syndrome    Assessment & Plan    Trigger finger, right index finger    Carpal tunnel syndrome, bilateral       Catalina Mcgarry is a 69 y.o. female with continued symptomatic right carpal tunnel syndrome, right index trigger finger     We discussed multiple treatment options including non-operative and operative treatment options. Nonoperatively we discussed options including nighttime braces and injections. Operatively we discussed carpal tunnel release and trigger finger release.  We discussed the indications for surgery as well as the rehabilitation associated with surgery, possible need for physical therapy, and the time table for returns to normal activities of daily living. We discussed the risks and benefits of surgery in detail, specifically risks of damage to surrounding neurovascular structures, " CRPS, stiffness/arthrofibrosis, poor functional result and possible need in the future for revision surgery. We also discussed risk of bleeding and infection, and wound healing complications. Despite the risks as explained to them, they wished to proceed with surgery to restore normal anatomy, function and improve pain. She expressed understanding and agreement with the treatment plan and understand that no guarantee of outcome is implied.     _________________________________________________________________      Catalina Mcgarry will be scheduled for the following procedure(s):     Right carpal tunnel release  Right A1 pulley release, index finger      Post-Op Medications to be prescribed:   Percocet 5/325mg Take 1-2 tablets every 4-6 hours PRN pain #28  Zofran 4mg oral disintegrating tablets every 8 hours PRN nausea/vomiting   Motrin 600 mg TID PRN     DME/Bracing:  None  Post-op Physical Therapy to begin:  TBD    Medical Clearance: No  Hx of DVT,PE, anesthetic complications: No    Additional notes/concerns:  Patient prefers March 31st

## 2025-02-26 NOTE — H&P
Patient ID: Catalina Mcgarry is a 69 y.o. female    Chief Complaint:   No chief complaint on file.      History of Present Illness:    Pleasant 69-year-old female with diabetes last A1c 7.6 here for evaluation of bilateral hand pain.  She reports about a 1 year history of bilateral hand pain.  Both are painful and mostly involve the wrist palm and median 3 digits.  She does have burning pain in the ulnar aspect of the hand as well as the small finger bilaterally.  Denies any elbow pain.  [facility]  She has had what sounds to be carpal tunnel injections x1 bilaterally which did improve her symptoms temporarily.    ____________________________________________________________________    Interval history 02/26/2025 : Patient returns today for EMG follow-up of their bilateral hands.  They report no changes since I saw them last.  Also reports pain along the index finger    PAST MEDICAL HISTORY:   Past Medical History:   Diagnosis Date    Diabetes mellitus     Diabetes mellitus, type 2     Headache(784.0)      PAST SURGICAL HISTORY:   Past Surgical History:   Procedure Laterality Date    BREAST BIOPSY      GUM SURGERY       FAMILY HISTORY:   Family History   Problem Relation Name Age of Onset    Diabetes Maternal Aunt      Diabetes Son      Breast cancer Maternal Grandmother      Migraines Neg Hx       SOCIAL HISTORY:   Social History     Occupational History    Occupation: dispatcher     Employer: [employer]     Comment: Dispatcher   Tobacco Use    Smoking status: Former     Current packs/day: 0.00     Types: Cigarettes     Quit date: 7/14/2014     Years since quitting: 10.6    Smokeless tobacco: Never   Substance and Sexual Activity    Alcohol use: No    Drug use: No    Sexual activity: Not Currently        MEDICATIONS:   Current Outpatient Medications:     ascorbic acid (VITAMIN C) 500 MG tablet, Take 500 mg by mouth once daily., Disp: , Rfl:     blood sugar diagnostic Strp, 1 strip  "by Misc.(Non-Drug; Combo Route) route 3 (three) times daily before meals., Disp: 200 strip, Rfl: 3    blood-glucose sensor (DEXCOM G6 SENSOR) Eileen, 1 each by Misc.(Non-Drug; Combo Route) route every 10 days. Apply/change sensor to skin every 10 days., Disp: 10 each, Rfl: 3    blood-glucose transmitter (DEXCOM G6 TRANSMITTER) Eileen, 1 each by Misc.(Non-Drug; Combo Route) route Daily. Use transmitter in sensor with G6 system. Change new transmitter every 3 months., Disp: 1 each, Rfl: 3    ciclopirox (PENLAC) 8 % Soln, Apply topically nightly as directed, Disp: 6.6 mL, Rfl: 2    fluticasone propionate (FLONASE) 50 mcg/actuation nasal spray, 1 spray by Each Nostril route once daily., Disp: , Rfl:     glucagon (BAQSIMI) 3 mg/actuation Spry, 1 each by Nasal route daily as needed (IF GLUCOSE IS LESS THAN 70)., Disp: 2 each, Rfl: 1    insulin aspart U-100 (NOVOLOG FLEXPEN U-100 INSULIN) 100 unit/mL (3 mL) InPn pen, To have in case of pump failure. ICR 1:9 TID AC + correction. MAX TDD of 50 units (Patient not taking: Reported on 2/26/2025), Disp: 15 mL, Rfl: 6    insulin aspart, niacinamide, (FIASP U-100 INSULIN) 100 unit/mL Soln, To use continuously with Omnipod 5. MAX total daily dose: 100 units., Disp: 30 mL, Rfl: 11    insulin glargine U-100, Lantus, (LANTUS SOLOSTAR U-100 INSULIN) 100 unit/mL (3 mL) InPn pen, Inject 24 Units into the skin once daily. IN AN EMERGENCY, USE THIS DAILY IF YOU ARE OFF OF YOUR INSULIN PUMP (Patient not taking: Reported on 12/3/2024), Disp: 15 mL, Rfl: 6    insulin pump cart,auto,BT,G6/7 (OMNIPOD 5 G6-G7 PODS, GEN 5,) Crtg, Inject 1 Device into the skin every 72 hours., Disp: 30 each, Rfl: 3    lancets (ACTI-JUANCHO LANCETS) 28 gauge Misc, 1 lancet  by Misc.(Non-Drug; Combo Route) route 3 (three) times daily before meals., Disp: 200 each, Rfl: 3    pen needle, diabetic 32 gauge x 5/32" Ndle, Inject 1 each into the skin 4 (four) times daily as needed (IF INSULIN PENS IF OFF OF INSULIN PUMP)., " Disp: 90 each, Rfl: 1    rosuvastatin (CRESTOR) 10 MG tablet, Take 1 tablet (10 mg total) by mouth every evening., Disp: 90 tablet, Rfl: 2  ALLERGIES: Review of patient's allergies indicates:  No Known Allergies      Physical Exam     There were no vitals filed for this visit.    Alert and oriented to person, place and time. No acute distress. Well-groomed, not ill appearing. Pupils round and reactive, normal respiratory effort, no audible wheezing.     On exam she has no significant atrophy.  She has mild swelling of the hands.  She has negative Tinel's at the wrist.  Positive Phalen's and Durkan's.  Positive carpal tunnel compression test bilaterally.  Negative Tinel's at the elbow.  Full range of motion of the elbow.  Negative Spurling's.  Tenderness over the A1 pulley right index finger.  Limited range of motion secondary to pain      Imaging:       X-Ray: I have reviewed all pertinent results/findings and my personal findings are:  Negative bilateral wrist radiographs  EMG reviewed showing severe right carpal tunnel syndrome, moderate left carpal tunnel syndrome    Assessment & Plan    Trigger finger, right index finger  -     Vital signs; Standing  -     Cleanse with Chlorhexidine (CHG); Standing  -     Diet NPO; Standing  -     Place sequential compression device; Standing  -     Chlorohexidine Gluconate Bath; Standing  -     Case Request Operating Room: RELEASE, CARPAL TUNNEL, RELEASE, TRIGGER FINGER  -     Full code; Standing  -     Place in Outpatient; Standing    Right carpal tunnel syndrome  -     Vital signs; Standing  -     Cleanse with Chlorhexidine (CHG); Standing  -     Diet NPO; Standing  -     Place sequential compression device; Standing  -     Chlorohexidine Gluconate Bath; Standing  -     Case Request Operating Room: RELEASE, CARPAL TUNNEL, RELEASE, TRIGGER FINGER  -     Full code; Standing  -     Place in Outpatient; Standing    Carpal tunnel syndrome on right    Other orders  -     IP VTE LOW  RISK PATIENT; Standing  -     ceFAZolin 2 g in D5W 50 mL IVPB (MB+)       Catalina Mcgarry is a 69 y.o. female with continued symptomatic right carpal tunnel syndrome, right index trigger finger     We discussed multiple treatment options including non-operative and operative treatment options. Nonoperatively we discussed options including nighttime braces and injections. Operatively we discussed carpal tunnel release and trigger finger release.  We discussed the indications for surgery as well as the rehabilitation associated with surgery, possible need for physical therapy, and the time table for returns to normal activities of daily living. We discussed the risks and benefits of surgery in detail, specifically risks of damage to surrounding neurovascular structures, CRPS, stiffness/arthrofibrosis, poor functional result and possible need in the future for revision surgery. We also discussed risk of bleeding and infection, and wound healing complications. Despite the risks as explained to them, they wished to proceed with surgery to restore normal anatomy, function and improve pain. She expressed understanding and agreement with the treatment plan and understand that no guarantee of outcome is implied.     _________________________________________________________________      Catalina Mcgarry will be scheduled for the following procedure(s):     Right carpal tunnel release  Right A1 pulley release, index finger      Post-Op Medications to be prescribed:   Percocet 5/325mg Take 1-2 tablets every 4-6 hours PRN pain #28  Zofran 4mg oral disintegrating tablets every 8 hours PRN nausea/vomiting   Motrin 600 mg TID PRN     DME/Bracing:  None  Post-op Physical Therapy to begin:  TBD    Medical Clearance: No  Hx of DVT,PE, anesthetic complications: No    Additional notes/concerns:  Patient prefers March 31st

## 2025-03-12 ENCOUNTER — TELEPHONE (OUTPATIENT)
Dept: DIABETES | Facility: CLINIC | Age: 70
End: 2025-03-12
Payer: MEDICARE

## 2025-03-19 ENCOUNTER — TELEPHONE (OUTPATIENT)
Dept: DIABETES | Facility: CLINIC | Age: 70
End: 2025-03-19
Payer: MEDICARE

## 2025-03-19 NOTE — TELEPHONE ENCOUNTER
----- Message from Dena sent at 3/19/2025  8:58 AM CDT -----  Contact: [phone]  [email or web address] Patient is calling for Medical Advice regarding: Patient is asking about status for insulin pump cart,auto,BT,G6/7 (OMNIPOD 5 G6-G7 PODS, GEN 5,) Crtg request. Patient wants a call back or thru [facility]: Call back Comments: Thank you Please advise patient replies from provider may take up to 48 hours.

## 2025-03-28 ENCOUNTER — TELEPHONE (OUTPATIENT)
Dept: ORTHOPEDICS | Facility: CLINIC | Age: 70
End: 2025-03-28
Payer: MEDICARE

## 2025-03-31 DIAGNOSIS — M65.321 TRIGGER FINGER, RIGHT INDEX FINGER: ICD-10-CM

## 2025-03-31 DIAGNOSIS — G89.18 ACUTE POST-OPERATIVE PAIN: ICD-10-CM

## 2025-03-31 DIAGNOSIS — G56.01 CARPAL TUNNEL SYNDROME ON RIGHT: Primary | ICD-10-CM

## 2025-03-31 RX ORDER — ONDANSETRON 4 MG/1
4 TABLET, ORALLY DISINTEGRATING ORAL 2 TIMES DAILY
Qty: 20 TABLET | Refills: 0 | Status: SHIPPED | OUTPATIENT
Start: 2025-03-31

## 2025-03-31 RX ORDER — IBUPROFEN 600 MG/1
600 TABLET ORAL 3 TIMES DAILY
Qty: 60 TABLET | Refills: 0 | Status: SHIPPED | OUTPATIENT
Start: 2025-03-31

## 2025-03-31 RX ORDER — OXYCODONE AND ACETAMINOPHEN 5; 325 MG/1; MG/1
1 TABLET ORAL EVERY 4 HOURS PRN
Qty: 20 EACH | Refills: 0 | Status: SHIPPED | OUTPATIENT
Start: 2025-03-31

## 2025-04-01 ENCOUNTER — TELEPHONE (OUTPATIENT)
Dept: DIABETES | Facility: CLINIC | Age: 70
End: 2025-04-01
Payer: MEDICARE

## 2025-04-01 NOTE — TELEPHONE ENCOUNTER
----- Message from Kathy sent at 4/1/2025  1:37 PM CDT -----  Regarding: Consult/Advisory  Contact: Josefina @ [phone]  Consult/Advisory Name Of Caller: Josefina ([employer])  Contact Preference: [phone] Nature of call:  calling to get PA on [employer] 5Case [id number]

## 2025-04-02 ENCOUNTER — TELEPHONE (OUTPATIENT)
Dept: ORTHOPEDICS | Facility: CLINIC | Age: 70
End: 2025-04-02
Payer: MEDICARE

## 2025-04-02 ENCOUNTER — TELEPHONE (OUTPATIENT)
Dept: DIABETES | Facility: CLINIC | Age: 70
End: 2025-04-02
Payer: MEDICARE

## 2025-04-02 NOTE — TELEPHONE ENCOUNTER
Spoke with patient. Discussed she removed her bandage and put Neosporin on the area.  Discussed to keep it clean and dry and use waterproof bandages.  Patient is postop was also scheduled to early, we will change this.  Pain well controlled.  Patient verbalized understanding.  All questions answered.

## 2025-04-15 ENCOUNTER — OFFICE VISIT (OUTPATIENT)
Dept: ORTHOPEDICS | Facility: CLINIC | Age: 70
End: 2025-04-15
Payer: MEDICARE

## 2025-04-15 VITALS — HEART RATE: 88 BPM | SYSTOLIC BLOOD PRESSURE: 92 MMHG | DIASTOLIC BLOOD PRESSURE: 53 MMHG

## 2025-04-15 DIAGNOSIS — Z98.890 S/P CARPAL TUNNEL RELEASE: Primary | ICD-10-CM

## 2025-04-15 PROCEDURE — 99999 PR PBB SHADOW E&M-EST. PATIENT-LVL III: CPT | Mod: PBBFAC,,,

## 2025-04-15 NOTE — PROGRESS NOTES
Post-op Note    HPI    Catalina Mcgarry is here 2 weeks s/p the following procedure:     3/31/2025  Right open carpal tunnel release  A1 pulley release right index finger     Overall doing well. Pain controlled on current regimen. Not in OT. Denies any chest pain or shortness of breathe. Denies any drainage from the incision. Denies any fevers, chills or paresthesias.     Physical Exam:     Patient is alert and oriented no acute distress.   Assistive Device: none    Right hand: sutures removed. Incision(s) are well healed.  There is no evidence of dehiscence. Scab to A1 pulley incision. There is no induration erythema or signs of infection.  Appropriate soft tissue swelling.  Compartments are soft and compressible.  Warm well-perfused extremity.      Assessment    Catalina Mcgarry is 2 weeks Post-op     Plan:    Overall doing as expected.  We discussed expectations of surgery and postoperative course.  Discussed main goal of carpal tunnel release is to preserve nerve function and symptoms can take up to 1 year to resolve    Pain: Continued postoperative pain regimen -- OTC meds prn.   PT/OT: Continue/Initiate physical therapy (weight bearing status: cup of coffee x 2 weeks, then return as tolerated)     In 24 hours, you may shower without covering your incision.  Do not submerge your incision for another 2 weeks.  If steri strips applied, they can get wet, remove in 48-72 hours if not falling off on their own. Do not submerge incision for another 2 weeks. You may start to do a scar massage with vitamin-E oil/cocoa butter to your incisions. Please inform the clinic if you experience any bleeding or discharge, warmth, or redness.       Follow-up: 4 weeks- may consider left carpal tunnel release at that time.  Patient will contact the clinic if she wishes to proceed  X-rays next visit: none

## 2025-05-14 ENCOUNTER — OFFICE VISIT (OUTPATIENT)
Dept: ORTHOPEDICS | Facility: CLINIC | Age: 70
End: 2025-05-14
Payer: MEDICARE

## 2025-05-14 VITALS
SYSTOLIC BLOOD PRESSURE: 135 MMHG | BODY MASS INDEX: 25.46 KG/M2 | DIASTOLIC BLOOD PRESSURE: 69 MMHG | WEIGHT: 143.75 LBS | HEART RATE: 81 BPM

## 2025-05-14 DIAGNOSIS — Z98.890 S/P CARPAL TUNNEL RELEASE: Primary | ICD-10-CM

## 2025-05-14 PROCEDURE — 99999 PR PBB SHADOW E&M-EST. PATIENT-LVL III: CPT | Mod: PBBFAC,,, | Performed by: ORTHOPAEDIC SURGERY

## 2025-05-14 NOTE — PROGRESS NOTES
Post-op Note    HPI    Catalina Mcgarry is here 6 weeks s/p the following procedure:     3/31/2025  Right open carpal tunnel release  A1 pulley release right index finger     Overall doing well.  No pain.  Some weakness but getting better.  No significant swelling.  No drainage from the incisions.  Healed well.    Physical Exam:     Patient is alert and oriented no acute distress.   Assistive Device: none    Right hand:  Index finger and carpal tunnel incisions well healed.  There is no evidence of dehiscence.  Able to make composite fist.  There is no induration erythema or signs of infection.  Mild soft tissue swelling.  Compartments are soft and compressible.  Warm well-perfused extremity.  Neurovascularly intact      Assessment    Catalina Mcgarry is 6 weeks Post-op     Plan:    Overall doing well.  I have no restrictions from my standpoint.  We did discuss physical therapy as an option but she feels like she is getting better with time.  At some point we would like a left carpal tunnel release.  She will get that arranged and a later date

## 2025-08-05 ENCOUNTER — TELEPHONE (OUTPATIENT)
Dept: DIABETES | Facility: CLINIC | Age: 70
End: 2025-08-05

## 2025-08-05 NOTE — TELEPHONE ENCOUNTER
Returned call to pt and she req to r/s her appt with DM bc she is at the eye dr and she won't be able to make it today. R/S appt.

## 2025-08-06 ENCOUNTER — OFFICE VISIT (OUTPATIENT)
Dept: DIABETES | Facility: CLINIC | Age: 70
End: 2025-08-06
Payer: MEDICARE

## 2025-08-06 VITALS
OXYGEN SATURATION: 98 % | RESPIRATION RATE: 16 BRPM | WEIGHT: 140 LBS | HEART RATE: 87 BPM | BODY MASS INDEX: 24.8 KG/M2 | DIASTOLIC BLOOD PRESSURE: 64 MMHG | SYSTOLIC BLOOD PRESSURE: 118 MMHG

## 2025-08-06 DIAGNOSIS — E10.649 TYPE 1 DIABETES MELLITUS WITH HYPOGLYCEMIA AND WITHOUT COMA: ICD-10-CM

## 2025-08-06 DIAGNOSIS — Z46.81 INSULIN PUMP FITTING OR ADJUSTMENT: ICD-10-CM

## 2025-08-06 DIAGNOSIS — E10.3513 PROLIFERATIVE DIABETIC RETINOPATHY OF BOTH EYES WITH MACULAR EDEMA ASSOCIATED WITH TYPE 1 DIABETES MELLITUS: ICD-10-CM

## 2025-08-06 DIAGNOSIS — E10.65 TYPE 1 DIABETES MELLITUS WITH HYPERGLYCEMIA: Primary | ICD-10-CM

## 2025-08-06 DIAGNOSIS — E11.311 DIABETIC MACULAR EDEMA, BOTH EYES: ICD-10-CM

## 2025-08-06 DIAGNOSIS — Z96.41 INSULIN PUMP IN PLACE: ICD-10-CM

## 2025-08-06 DIAGNOSIS — E78.5 DYSLIPIDEMIA, GOAL LDL BELOW 100: ICD-10-CM

## 2025-08-06 DIAGNOSIS — Z71.9 HEALTH EDUCATION/COUNSELING: ICD-10-CM

## 2025-08-06 PROCEDURE — 95251 CONT GLUC MNTR ANALYSIS I&R: CPT | Mod: S$GLB,,, | Performed by: NURSE PRACTITIONER

## 2025-08-06 PROCEDURE — 99214 OFFICE O/P EST MOD 30 MIN: CPT | Mod: S$GLB,,, | Performed by: NURSE PRACTITIONER

## 2025-08-06 PROCEDURE — 1160F RVW MEDS BY RX/DR IN RCRD: CPT | Mod: CPTII,S$GLB,, | Performed by: NURSE PRACTITIONER

## 2025-08-06 PROCEDURE — 3078F DIAST BP <80 MM HG: CPT | Mod: CPTII,S$GLB,, | Performed by: NURSE PRACTITIONER

## 2025-08-06 PROCEDURE — 3051F HG A1C>EQUAL 7.0%<8.0%: CPT | Mod: CPTII,S$GLB,, | Performed by: NURSE PRACTITIONER

## 2025-08-06 PROCEDURE — 3074F SYST BP LT 130 MM HG: CPT | Mod: CPTII,S$GLB,, | Performed by: NURSE PRACTITIONER

## 2025-08-06 PROCEDURE — 1159F MED LIST DOCD IN RCRD: CPT | Mod: CPTII,S$GLB,, | Performed by: NURSE PRACTITIONER

## 2025-08-06 PROCEDURE — 99999 PR PBB SHADOW E&M-EST. PATIENT-LVL IV: CPT | Mod: PBBFAC,,, | Performed by: NURSE PRACTITIONER

## 2025-08-06 PROCEDURE — 3008F BODY MASS INDEX DOCD: CPT | Mod: CPTII,S$GLB,, | Performed by: NURSE PRACTITIONER

## 2025-08-06 RX ORDER — PEN NEEDLE, DIABETIC 30 GX3/16"
NEEDLE, DISPOSABLE MISCELLANEOUS
Qty: 150 EACH | Refills: 11 | Status: SHIPPED | OUTPATIENT
Start: 2025-08-06

## 2025-08-06 RX ORDER — ROSUVASTATIN CALCIUM 10 MG/1
10 TABLET, COATED ORAL NIGHTLY
Qty: 90 TABLET | Refills: 2 | Status: SHIPPED | OUTPATIENT
Start: 2025-08-06

## 2025-08-06 RX ORDER — INSULIN ASPART 100 [IU]/ML
INJECTION, SOLUTION INTRAVENOUS; SUBCUTANEOUS
Qty: 15 ML | Refills: 11 | Status: SHIPPED | OUTPATIENT
Start: 2025-08-06

## 2025-08-06 RX ORDER — INSULIN PMP CART,AUT,G6/7,CNTR
1 EACH SUBCUTANEOUS
Qty: 30 EACH | Refills: 3 | Status: SHIPPED | OUTPATIENT
Start: 2025-08-06 | End: 2025-08-06 | Stop reason: SDUPTHER

## 2025-08-06 RX ORDER — INSULIN GLARGINE 100 [IU]/ML
24 INJECTION, SOLUTION SUBCUTANEOUS NIGHTLY
Qty: 15 ML | Refills: 11 | Status: SHIPPED | OUTPATIENT
Start: 2025-08-06

## 2025-08-06 RX ORDER — GLUCAGON 3 MG/1
1 POWDER NASAL DAILY PRN
Qty: 2 EACH | Refills: 1 | Status: SHIPPED | OUTPATIENT
Start: 2025-08-06

## 2025-08-06 RX ORDER — INSULIN ASPART INJECTION 100 [IU]/ML
INJECTION, SOLUTION SUBCUTANEOUS
Qty: 30 ML | Refills: 11 | Status: SHIPPED | OUTPATIENT
Start: 2025-08-06

## 2025-08-06 RX ORDER — INSULIN PMP CART,AUT,G6/7,CNTR
1 EACH SUBCUTANEOUS
Qty: 45 EACH | Refills: 3 | Status: SHIPPED | OUTPATIENT
Start: 2025-08-06

## 2025-08-06 NOTE — Clinical Note
Wants to upgrade to the dexcom G7-- please send orders for G7 to [facility] with updated chart notes- via parachute please

## 2025-08-06 NOTE — ASSESSMENT & PLAN NOTE
Discussed ADA recommendations  LDL goal <100  She reports that she has been more compliant with the Crestor  Check lipids with RTC

## 2025-08-06 NOTE — PATIENT INSTRUCTIONS
Pump backup plan    If the insulin pump is non functional and discontinued for anticipated more than 20 hours, please give daily injections of:   Long acting insulin Lantus 24 units daily   Short acting insulin Novolog or Fiasp for meals according to carb ratios and sensitivity factor in the pump. 1:9    When the insulin pump is restarted, do not restart basal rates until at least 22 hours after the last long acting insulin injection. You can set a 0% temporary basal setting that will last until this time and use your pump to bolus for meals and correction.     For any technical insulin pump issues, please contact the insulin pump company; the toll free number is printed on the label on the back of the insulin pump.       If your sugar is running high for a few hours and does not respond to two correction doses from the insulin pump, it may mean that you have a bad pump site and the site should be changed ,

## 2025-08-06 NOTE — ASSESSMENT & PLAN NOTE
"Uncontrolled   Noncompliance with follow up visits are hindering overall management   She is adamant that she is accurately carbohydrate counting but that her blood sugar readings are staying high despite entering in the correct amount of carbs-- her CHO ratio was back to 1:9   Now on Fiasp   She would like to get the Omnipod ramesh on her phone and change over to the ramesh and not have to use the    Would like to upgrade to the dexcom G7   She needs to meet with diabetes education for upgrade           -- Medication Changes:   Continue Omnipod 5  Continue the  Fiasp     Stay in automated mode 100% of the time  Reviewed correction boluses---discussed hitting "use sensor"       Pump settings:  Basal:  MN- 12 ( noon)- continue 0.85 units/hr   12 (noon)- MN: increase to 1 unit/hr   ICR:1:8.5--tighten  ISF: 1:40  Target: 110  IOB: 3 hours     Will have her meet with education to get the dexcom G7 upgrade and get the omnipod 5 ramesh on her phone       Discussed the importance of back up insulin if off her pump     -- Reviewed goals of therapy are to get the best control we can without hypoglycemia.  -- Reviewed patient's current insulin regimen. Clarified proper insulin dose and timing in relation to meals, etc. Insulin injection sites and proper rotation instructed.    -- Advised frequent self blood glucose monitoring.  Patient encouraged to document glucose results and bring them to every clinic visit.--upgrade to the dexcom G7- supplies from [facility]   -- Hypoglycemia precautions discussed. Instructed on precautions before driving.    -- Call for Bg repeatedly < 70 or > 180.   -- Close adherence to lifestyle changes recommended.   -- Periodic follow ups for eye evaluations, foot care and dental care suggested.  -- Refer to diabetes education---get the Omnipod 5 ramesh on phone and upgrade to the dexcom G7       Patient has diabetes mellitus and manages diabetes with intensive insulin regimen and uses prandial and " basal insulin daily-- via omnipod 5 insulin pump   Patient requires a therapeutic CGM and is willing to use therapeutic CGM for the necessary frequent adjustments of insulin therapy.  I have completed an in-person visit during the previous 6 months and will continue to have in-person visits every 6 months to assess adherence to their CGM regimen and diabetes treatment plan.  Due to COVID pandemic and need for remote monitoring this tool is medically necessary

## 2025-08-07 ENCOUNTER — TELEPHONE (OUTPATIENT)
Dept: DIABETES | Facility: CLINIC | Age: 70
End: 2025-08-07
Payer: MEDICARE

## 2025-08-11 ENCOUNTER — OFFICE VISIT (OUTPATIENT)
Dept: ORTHOPEDICS | Facility: CLINIC | Age: 70
End: 2025-08-11
Payer: MEDICARE

## 2025-08-11 VITALS
BODY MASS INDEX: 25.04 KG/M2 | HEIGHT: 63 IN | DIASTOLIC BLOOD PRESSURE: 70 MMHG | SYSTOLIC BLOOD PRESSURE: 150 MMHG | WEIGHT: 141.31 LBS

## 2025-08-11 DIAGNOSIS — L60.0 INGROWN NAIL OF FOURTH TOE OF RIGHT FOOT: ICD-10-CM

## 2025-08-11 DIAGNOSIS — G56.02 CARPAL TUNNEL SYNDROME ON LEFT: ICD-10-CM

## 2025-08-11 DIAGNOSIS — Z98.890 S/P CARPAL TUNNEL RELEASE: Primary | ICD-10-CM

## 2025-08-11 PROCEDURE — 1101F PT FALLS ASSESS-DOCD LE1/YR: CPT | Mod: CPTII,S$GLB,,

## 2025-08-11 PROCEDURE — 3077F SYST BP >= 140 MM HG: CPT | Mod: CPTII,S$GLB,,

## 2025-08-11 PROCEDURE — 99213 OFFICE O/P EST LOW 20 MIN: CPT | Mod: S$GLB,,,

## 2025-08-11 PROCEDURE — 3288F FALL RISK ASSESSMENT DOCD: CPT | Mod: CPTII,S$GLB,,

## 2025-08-11 PROCEDURE — 3008F BODY MASS INDEX DOCD: CPT | Mod: CPTII,S$GLB,,

## 2025-08-11 PROCEDURE — 3052F HG A1C>EQUAL 8.0%<EQUAL 9.0%: CPT | Mod: CPTII,S$GLB,,

## 2025-08-11 PROCEDURE — 1159F MED LIST DOCD IN RCRD: CPT | Mod: CPTII,S$GLB,,

## 2025-08-11 PROCEDURE — 1160F RVW MEDS BY RX/DR IN RCRD: CPT | Mod: CPTII,S$GLB,,

## 2025-08-11 PROCEDURE — 99999 PR PBB SHADOW E&M-EST. PATIENT-LVL IV: CPT | Mod: PBBFAC,,,

## 2025-08-11 PROCEDURE — 3078F DIAST BP <80 MM HG: CPT | Mod: CPTII,S$GLB,,
